# Patient Record
Sex: MALE | Race: WHITE
[De-identification: names, ages, dates, MRNs, and addresses within clinical notes are randomized per-mention and may not be internally consistent; named-entity substitution may affect disease eponyms.]

---

## 2017-08-09 ENCOUNTER — HOSPITAL ENCOUNTER (OUTPATIENT)
Dept: HOSPITAL 47 - RADECHMAIN | Age: 77
Discharge: HOME | End: 2017-08-09
Payer: MEDICARE

## 2017-08-09 DIAGNOSIS — I08.3: ICD-10-CM

## 2017-08-09 DIAGNOSIS — Z01.810: Primary | ICD-10-CM

## 2017-08-09 DIAGNOSIS — I27.2: ICD-10-CM

## 2017-08-09 DIAGNOSIS — I31.3: ICD-10-CM

## 2017-08-09 PROCEDURE — 93306 TTE W/DOPPLER COMPLETE: CPT

## 2017-08-10 NOTE — ECHOF
Referral Reason:[id number] Pre Chemo



MEASUREMENTS

--------

HEIGHT: 172.7 cm

WEIGHT: 83.0 kg

BP: 130/60

RVIDd:   3.3 cm     (< 3.3)

IVSd:   1.0 cm     (0.6 - 1.1)

LVIDd:   4.9 cm     (3.9 - 5.3)

LVPWd:   1.0 cm     (0.6 - 1.1)

IVSs:   1.5 cm

LVIDs:   4.2 cm

LVPWs:   1.5 cm

LAESV Index (A-L):   28.74 ml/m

Ao Diam:   3.8 cm     (2.0 - 3.7)

AV Cusp:   0.9 cm     (1.5 - 2.6)

LA Diam:   4.1 cm     (2.7 - 3.8)

MV E Oliver:   0.72 m/s

MV DecT:   344 ms

MV A Oliver:   0.97 m/s

MV E/A Ratio:   0.74 

AV maxP.30 mmHg

AV meanP.43 mmHg

RAP:   5.00 mmHg

RVSP:   37.37 mmHg







FINDINGS

--------

Sinus rhythm with extra systolic beats.

This was a technically adequate study.

Overall left ventricular systolic function is normal 

with, an EF between 55 - 60 %.

The right ventricle is normal in size and function.

LA is midly dilated 29-33ml/m2.

The right atrium is normal in size.

Aortic valve is trileaflet and is severely thickened.   

Trace amount of aortic regurgitation.    Moderate to 

severe aortic stenosis with peak/mean pressure gradient 

of 61.30mmHg / 37.43mmHg,

Mild mitral annular calcification present.   There is 

trace to mild mitral regurgitation.

Mild tricuspid regurgitation present.   There is mild 

pulmonary hypertension.   The right ventricular 

systolic pressure, as measured by Doppler, is 37.37mmHg.

The pulmonic valve was not well visualized.

The aortic root size is normal.

Normal inferior vena cava with normal inspiratory 

collapse consistent with estimated right atrial 

pressure of  5 mmHg.

There is a small pericardial effusion is located near 

the right ventricle.



CONCLUSIONS

--------

1. Sinus rhythm with extra systolic beats.

2. There is mild pulmonary hypertension.

3. The right ventricular systolic pressure, as measured by 

Doppler, is 37.37mmHg.

4. The pulmonic valve was not well visualized.

5. The aortic root size is normal.

6. There is a small pericardial effusion is located near 

the right ventricle.

7. This was a technically adequate study.

8. LA is midly dilated 29-33ml/m2.

9. Aortic valve is trileaflet and is severely thickened.

10. Trace amount of aortic regurgitation.

11. Moderate to severe aortic stenosis with peak/mean 

pressure gradient of 61.30mmHg / 37.43mmHg,

12. Mild mitral annular calcification present.

13. There is trace to mild mitral regurgitation.

14. Mild tricuspid regurgitation present.





SONOGRAPHER: Art Bishop RDCS

## 2017-08-11 ENCOUNTER — HOSPITAL ENCOUNTER (OUTPATIENT)
Dept: HOSPITAL 47 - OR | Age: 77
Discharge: HOME | End: 2017-08-11
Attending: SURGERY
Payer: MEDICARE

## 2017-08-11 VITALS — TEMPERATURE: 98.1 F | RESPIRATION RATE: 16 BRPM

## 2017-08-11 VITALS — DIASTOLIC BLOOD PRESSURE: 63 MMHG | SYSTOLIC BLOOD PRESSURE: 113 MMHG

## 2017-08-11 VITALS — BODY MASS INDEX: 31 KG/M2

## 2017-08-11 VITALS — HEART RATE: 72 BPM

## 2017-08-11 DIAGNOSIS — K21.9: ICD-10-CM

## 2017-08-11 DIAGNOSIS — Z91.09: ICD-10-CM

## 2017-08-11 DIAGNOSIS — I10: ICD-10-CM

## 2017-08-11 DIAGNOSIS — Z91.018: ICD-10-CM

## 2017-08-11 DIAGNOSIS — Z88.5: ICD-10-CM

## 2017-08-11 DIAGNOSIS — I48.91: ICD-10-CM

## 2017-08-11 DIAGNOSIS — C61: Primary | ICD-10-CM

## 2017-08-11 LAB
CH: 32
CHCM: 31.8
ERYTHROCYTE [DISTWIDTH] IN BLOOD BY AUTOMATED COUNT: 2.72 M/UL (ref 4.3–5.9)
ERYTHROCYTE [DISTWIDTH] IN BLOOD: 16 % (ref 11.5–15.5)
GLUCOSE BLD-MCNC: 102 MG/DL (ref 75–99)
HCT VFR BLD AUTO: 27.5 % (ref 39–53)
HDW: 3.24
HGB BLD-MCNC: 8.7 GM/DL (ref 13–17.5)
MCH RBC QN AUTO: 31.9 PG (ref 25–35)
MCHC RBC AUTO-ENTMCNC: 31.5 G/DL (ref 31–37)
MCV RBC AUTO: 101.2 FL (ref 80–100)
WBC # BLD AUTO: 19.9 K/UL (ref 3.8–10.6)

## 2017-08-11 PROCEDURE — 85027 COMPLETE CBC AUTOMATED: CPT

## 2017-08-11 PROCEDURE — 36561 INSERT TUNNELED CV CATH: CPT

## 2017-08-11 PROCEDURE — 76937 US GUIDE VASCULAR ACCESS: CPT

## 2017-08-11 PROCEDURE — 77001 FLUOROGUIDE FOR VEIN DEVICE: CPT

## 2017-08-11 NOTE — P.OP
Date of Procedure: 08/11/17


Preoperative Diagnosis: 


Metastatic prostate cancer


Difficult IV access


Postoperative Diagnosis: 


Same


Procedure(s) Performed: 


Right internal jugular 8-[place] Mediport placement under fluoroscopic and 

SonoSite guidance


Implants: 


8-[place] MediPort


Anesthesia: MAC, local


Surgeon: Stefani Swift


Pathology: none sent


Condition: stable


Disposition: PACU


Indications for Procedure: 


76 years old male presents with metastatic prostatic cancer is undergoing 

chemotherapy.  He presents today for Mediport placement


Operative Findings: 





Description of Procedure: 


The patient was brought to the operating room and placed in supine position 

with both arms tucked.  A footboard was placed.  Chlorhexidine was used to prep 

the neck followed by application of sterile drapes and an Ioban dressing .  A 

timeout was performed to verify correct patient and correct procedure.  Patient 

was confirmed to receive perioperative IV antibiotics and VTE prophylaxis.


An ultrasound was performed of the right neck to identify the carotid artery 

and internal jugular vein.  The internal jugular vein was compressible and 

patent .  Photodocumentation  was made.


Local anesthetic was infiltrated to create a field block.  Seldinger technique 

was used and the internal jugular vein was accessed under direct ultrasound 

guidance.  There was good backflow of dark venous blood.  The guidewire was 

inserted and fluoroscopic images obtained to confirm the tip in SVC.


The needle was removed followed by insertion of a dilator peel-away sheath.  

Local anesthetic was infiltrated along the inferior aspect of the right 

clavicle.  A 2.5 cm skin incision was made and dissection was carried up to the 

pectoralis major muscle.  A pocket was created for the port.  The catheter 

tubing was connected to the port using the conector after flushing both the 

port and the catheter with normal saline.  The tunneling device was connected 

to the end of the catheter and after placement of the port in the subcutaneous 

pocket the tunneling device was passed from the lower incision to  the counter 

incision in the neck.  The catheter was measured at the junction of SVC and 

right atrium.  The inner cannula of the peel-away sheath was removed and 

catheter was gradually inserted.  The peel-away sheath was gradually removed. 


Fluoroscopic image confirmed the tip of the catheter at the junction of SVC and 

right atrium.  There was no kink, fold or torsion of the catheter and the port.

  The Keith needle was used to access the port and easy backflow was obtained.  

This was flushed with 10 mL of normal saline and 10 mL of Hep-Lock was inserted.


The skin incision was closed in 3 layers using 3-0 Vicryl interrupted stitches 

and a running suture of 4-0 Monocryl.  Counter incision in the neck was also 

closed using 3-0 Vicryl followed by 4-0 Monocryl.  Dermabond skin glue was 

applied followed by Telfa and Tegaderm dressing.


The sponge, instrument and needle count were correct x2


Patient tolerated the procedure well and was taken to post anesthesia care unit 

in stable condition





Final chest x-ray showed the tip of the catheter in SVC and no pneumothorax.  

Total fluoroscopic time was 4 seconds

## 2017-08-11 NOTE — XR
EXAMINATION TYPE: XR chest 1V confirm line plcmt

 

DATE OF EXAM: 8/11/2017

 

COMPARISON: 9/21/2016

 

HISTORY: 76-year-old male Mediport placement, rule out pneumothorax

 

TECHNIQUE: Single frontal view of the chest is obtained.

 

FINDINGS:  

Right anterior chest wall injection port with catheter tip seen to the level of the lower SVC. Multip
le old healed right-sided rib fracture deformities are present. These fracture deformities cause abno
rmal linear densities and lucencies in the apex making assessment for a trace pneumothorax difficult.
 No sizable pneumothorax is seen. Old healed left clavicular shaft fracture deformity and suspected o
ld healed fracture distal right clavicle. There is some blunting of the left costophrenic angle noted
. The heart is normal size. Pulmonary vasculature within normal limits.

 

 

 

IMPRESSION:  

1. Right-sided injection port with tip at the lower SVC.

2. Extensive healed fracture deformities of multiple right-sided ribs. This distorts the normal appea
glenn at the right apex limiting assessment for small pneumothorax. No sizable pneumothorax is seen. 
Consider short interval follow-up.

3. Trace blunting of the left costophrenic angle could represent trace effusion or small amount of pa
tchy atelectasis/infiltrate.

## 2017-08-11 NOTE — FL
Fluoroscopy

 

HISTORY: Port-A-Cath insertion

 

4 seconds fluoroscopy time supplied to the referring clinician.  1 intraoperative C-arm images docume
nt the procedure. See dictated report from general surgery.

## 2018-05-01 ENCOUNTER — HOSPITAL ENCOUNTER (OUTPATIENT)
Dept: HOSPITAL 47 - RADNMMAIN | Age: 78
End: 2018-05-01
Payer: OTHER GOVERNMENT

## 2018-05-01 DIAGNOSIS — C61: Primary | ICD-10-CM

## 2018-05-01 LAB — BUN SERPL-SCNC: 18 MG/DL (ref 9–20)

## 2018-05-01 PROCEDURE — 82565 ASSAY OF CREATININE: CPT

## 2018-05-01 PROCEDURE — 84520 ASSAY OF UREA NITROGEN: CPT

## 2018-05-01 PROCEDURE — 78306 BONE IMAGING WHOLE BODY: CPT

## 2018-05-01 NOTE — NM
EXAMINATION TYPE: NM bone scan whole body

 

DATE OF EXAM: 5/1/2018

 

COMPARISON: Prior bone scan and CT 10/26/2017

 

HISTORY: Prostate cancer

 

Delayed whole-body scanning was performed following the injection of 25.1 mCi Tc 99m MDP.  Images acq
uired 3 hours post injection.

 

FINDINGS: 

 

Uptake at the level of the proximal femur is again noted. Suspect there is an ostomy bag in the right
 lower quadrant. Uptake present at the level of the pubic symphysis on the right greater than left, i
schium on the left and ilium on the right and posterior right eighth rib appear less intense. Questio
n a small focus of uptake involving the sternum on the right. Sacral activity corresponds to abnormal
ity seen on CT.

 

There is a mild spinal curvature. Old right-sided rib fractures again noted posteriorly, old left-bina
ed clavicular fracture noted and appears healed.

 

IMPRESSION: 

 

Some improvement in the intensity of uptake is noted.

## 2018-05-04 ENCOUNTER — HOSPITAL ENCOUNTER (OUTPATIENT)
Dept: HOSPITAL 47 - RADPROMAIN | Age: 78
Discharge: HOME | End: 2018-05-04
Payer: OTHER GOVERNMENT

## 2018-05-04 DIAGNOSIS — C61: Primary | ICD-10-CM

## 2018-05-04 DIAGNOSIS — C78.7: ICD-10-CM

## 2018-05-04 LAB — BUN SERPL-SCNC: 9 MG/DL (ref 9–20)

## 2018-05-04 PROCEDURE — 71260 CT THORAX DX C+: CPT

## 2018-05-04 PROCEDURE — 82565 ASSAY OF CREATININE: CPT

## 2018-05-04 PROCEDURE — 74177 CT ABD & PELVIS W/CONTRAST: CPT

## 2018-05-04 PROCEDURE — 84520 ASSAY OF UREA NITROGEN: CPT

## 2018-05-04 NOTE — CT
EXAMINATION TYPE: CT ChestAbdPelvis w con

 

DATE OF EXAM: 5/4/2018

 

COMPARISON: Prior CT chest abdomen and pelvis October 26, 2017 and older CT studies.

 

HISTORY: Prostate CA, observed for metastases.

 

CT DLP: 762.8 mGycm. Automated Exposure Control for Dose Reduction was Utilized.

 

 

CONTRAST: 

CT scan of the thorax, abdomen and pelvis is performed with oral and with IV Contrast, patient inject
ed with 100 mL of Isovue 300.

 

FINDINGS:

 

LUNGS: There is bibasilar scarring and/or atelectasis. No suspicious greater than 5 mm new parenchyma
l nodules or masses are present. There is no pleural effusion or pneumothorax seen.  The tracheobronc
hial tree is patent.

 

MEDIASTINUM: There are no greater than 1 cm hilar or mediastinal lymph nodes.   No cardiomegaly or pe
ricardial effusion is seen.  Coronary artery calcification is present which is noted marker for coron
israel artery disease. There is stable right internal jugular Mediport catheter.

 

OTHER:  No obvious supraclavicular adenopathy on current study

 

LIVER/GB: Multiple hypodense lesions consistent with hepatic metastatic disease are redemonstrated. T
arget lesion anterior segment right hepatic lobe measures 4.7 cm long axis adjacent to is slightly hy
podense oval lesion posteriorly axial image 53 measured 5.5 cm long axis prior study image 52. Lesion
 anterior aspect lateral segment left hepatic lobe measures 4.3 cm long axis axial image 54 versus 5.
2 cm long axis prior study image 52. Overall all suspicious lesions appear slightly smaller in size f
rom prior study. There are few denser lesions felt to reflect simple cysts are felt stable. No defini
tive new lesions are seen.

 

PANCREAS: No significant abnormality is seen.

 

SPLEEN: No significant abnormality is seen.

 

ADRENALS: Stable slight nodularity posterior limb left adrenal gland axial image 58 favored benign.

 

KIDNEYS: Along lateral aspect of right kidney there is new focal somewhat ill-defined fluid mid pole 
level axial image 66 of uncertain etiology. There is partially duplicated right-sided collecting syst
em with central calcifications likely vascular in etiology. There is symmetric cortical medullary upt
marcelo and excretion from both kidneys without hydronephrosis identified bilaterally. Bladder is surgica
lly absent. There is right-sided ileal conduit redemonstrated.

 

BOWEL: There is persistent distal colectomy with left mid sided colostomy. A few diverticula in the d
istal colon are present. There is no suspicious small or large bowel dilatation. There is persistent 
abnormal soft tissue in the presacral space near axial image 109 felt fairly stable favoring scar tis
jesse from prior surgery. There is stable small to moderate-sized hiatal hernia. No adjacent adenopathy
 is seen.

 

GENITAL ORGANS: Prostate is surgically absent.

 

LYMPH NODES: No new greater than 1cm abdominal or pelvic lymph nodes are appreciated. Stable subcenti
meter left periaortic lymph node measures 11 x 5 mm axial image 60.

 

OSSEOUS STRUCTURES: Metallic hardware in the right proximal femur is redemonstrated causing streak ar
tifact. Moderate to severe joint space loss and spurring and left hip is present.

 

Sclerotic metastatic disease involving left posterior acetabulum, right iliac crest coronal image 55,
 and lower sacrum seen best coronal image 79 is redemonstrated felt stable. There is demineralization
 with slight underlying scoliotic curvature moderate to severe multilevel spurring. There are numerou
s right-sided posterior and lateral healed rib fractures.

 

OTHER: There is moderate to severe calcified plaque of aorta extending into branch vessels

 

IMPRESSION: Overall positive treatment response (suspected patient is currently on chemotherapy) with
 diminished size of hepatic metastatic lesions. No new metastatic lesions are seen.  By RECIST criter
ia there is however less than 30% response and is thus technically stable disease by RECIST.

## 2018-07-31 ENCOUNTER — HOSPITAL ENCOUNTER (OUTPATIENT)
Dept: HOSPITAL 47 - RADNMMAIN | Age: 78
Discharge: HOME | End: 2018-07-31
Attending: INTERNAL MEDICINE
Payer: OTHER GOVERNMENT

## 2018-07-31 DIAGNOSIS — K76.9: ICD-10-CM

## 2018-07-31 DIAGNOSIS — R91.8: ICD-10-CM

## 2018-07-31 DIAGNOSIS — C61: ICD-10-CM

## 2018-07-31 DIAGNOSIS — R93.7: Primary | ICD-10-CM

## 2018-07-31 LAB — BUN SERPL-SCNC: 13 MG/DL (ref 9–20)

## 2018-07-31 PROCEDURE — 71260 CT THORAX DX C+: CPT

## 2018-07-31 PROCEDURE — 74177 CT ABD & PELVIS W/CONTRAST: CPT

## 2018-07-31 PROCEDURE — 82565 ASSAY OF CREATININE: CPT

## 2018-07-31 PROCEDURE — 78306 BONE IMAGING WHOLE BODY: CPT

## 2018-07-31 PROCEDURE — 84520 ASSAY OF UREA NITROGEN: CPT

## 2018-07-31 RX ADMIN — SODIUM CHLORIDE, PRESERVATIVE FREE PRN ML: 5 INJECTION INTRAVENOUS at 12:15

## 2018-07-31 RX ADMIN — SODIUM CHLORIDE, PRESERVATIVE FREE PRN ML: 5 INJECTION INTRAVENOUS at 10:25

## 2018-07-31 NOTE — NM
EXAMINATION TYPE: NM bone scan whole body

 

DATE OF EXAM: 7/31/2018

 

COMPARISON: 5/1/2018

 

HISTORY: Prostate cancer

 

Delayed whole-body scanning was performed following the injection of 24.5 mCi Tc 99m MDP.  Images acq
uired 3 hours post injection.

 

FINDINGS: 

Uptake at the level of the proximal femur is again noted. Suspect there is an ostomy bag in the right
 lower quadrant. Uptake present at the level of the pubic symphysis on the right greater than left, i
schium on the left and ilium on the right and posterior right eighth rib appear less intense. Questio
n a small focus of uptake involving the sternum on the right. Sacral activity corresponds to abnormal
ity seen on CT. 

 

There is a mild spinal curvature. Old right-sided rib fractures again noted posteriorly, old left-bina
ed clavicular fracture noted and appears healed. 

 

 

IMPRESSION: 

 

Stable abnormal uptake unchanged from the prior exam. Findings compatible with metastases.

## 2018-07-31 NOTE — CT
EXAMINATION TYPE: CT ChestAbdPelvis w con

 

DATE OF EXAM: 7/31/2018

 

INDICATION: Prostate CA, suspect mets

 

COMPARISON: 5/4/2018

 

CT DLP: 814.2 mGycm

 

CONTRAST: 

Performed with Oral Contrast and with IV Contrast, patient injected with 100 mL of Isovue 300.

 

TECHNIQUE: Axial images at 5 mm thick sections.  Reconstructed images in the coronal plane.  Delayed 
images through the kidneys.  

 

FINDINGS:

 

CT CHEST:

 

Portion of the thyroid visualized is normal.

 

No suspicious lung nodules or focal infiltrates are present.

 

No enlarged mediastinal or hilar adenopathy is evident. 

 

The ascending aorta diameter at the level of the main pulmonary artery is 3.4 cm.  The main pulmonary
 artery diameter at the bifurcation is 2.7 cm. Coronary artery calcifications present. Small right pl
eural effusion may be present. There is a 0.5 cm nodule at the posterior lateral left midlung. This w
as faintly visualized previously. Interval growth is not evident. Series 4 image 33 additional puncta
te densities in the right lower lobe superior segment measuring 0.3 cm. Series 4 image 33.

 

CT ABDOMEN:

 

Liver: There are multiple enlarged hypodensities with ill-defined margins within the liver compatible
 with metastatic disease. These are less defined than the comparison. Where comparable these appear l
arger. There is a more hypodense area within the mid medial right lobe liver measuring 5.0 cm which i
s enlarged from comparison. This has Hounsfield unit measurements of 23. Lesions are within both left
 and right lobes of liver. A more infiltrative process is not excluded in the left lobe liver the cur
rent examination. 

 

Spleen: Normal

 

Pancreas: Normal

 

Adrenal glands: The adrenal glands are normal.

 

Gallbladder: Normal  

 

Kidneys: No masses are evident. No hydronephrosis is present.   There is an ill-defined 3.2 cm hypode
nsity measuring 4 Hounsfield units on the posterior lateral right mid kidney present previously.  Del
ayed images were obtained through the kidneys, which remain unremarkable.

 

Aorta: Vascular calcification is within the aorta. 

 

Inferior vena cava: Normal.

 

CT PELVIS: 

Scattered diverticuli are within the colon. Ostomy site is in the right lower quadrant. There is a di
lated right ureter urostomy bag is on the right. Left ureter is less prominent than on the right.

 

 There are loops of bowel which are incompletely distended or lack oral contrast limiting their evalu
ation.

 

Appendix: Normal as visualized.

 

Urinary bladder: Surgically absent 

 

Genitourinary structures: Prostate is absent. Loops of bowel falling into the lower portion of the pe
lvis.

 

Osseous structures: There is a large sclerotic metastasis along the posterior left ilium. Prior repai
r of fractures in the right femur. Scoliosis and degenerative changes are through the spine.

 

IMPRESSIONS:

1. Hepatic lesions are less distinct than on the prior study and a more infiltrative process may be p
resent. Lesions appear larger than the comparison study.

2. Sclerotic metastasis likely along the posterior left ilium.

3. Postsurgical changes. There is some prominence of the right ureter.

4. Couple of small nodules within the bilateral lung fields, present previously. On the right this ha
s enlarged slightly from comparison

## 2018-09-30 ENCOUNTER — HOSPITAL ENCOUNTER (EMERGENCY)
Dept: HOSPITAL 47 - EC | Age: 78
Discharge: HOME | End: 2018-09-30
Payer: OTHER GOVERNMENT

## 2018-09-30 VITALS — RESPIRATION RATE: 16 BRPM

## 2018-09-30 VITALS — DIASTOLIC BLOOD PRESSURE: 80 MMHG | SYSTOLIC BLOOD PRESSURE: 144 MMHG | TEMPERATURE: 98 F | HEART RATE: 74 BPM

## 2018-09-30 DIAGNOSIS — Z85.46: ICD-10-CM

## 2018-09-30 DIAGNOSIS — Z91.018: ICD-10-CM

## 2018-09-30 DIAGNOSIS — Z87.891: ICD-10-CM

## 2018-09-30 DIAGNOSIS — Z79.899: ICD-10-CM

## 2018-09-30 DIAGNOSIS — Z88.6: ICD-10-CM

## 2018-09-30 DIAGNOSIS — I48.91: ICD-10-CM

## 2018-09-30 DIAGNOSIS — Z85.51: ICD-10-CM

## 2018-09-30 DIAGNOSIS — Z79.52: ICD-10-CM

## 2018-09-30 DIAGNOSIS — M79.89: Primary | ICD-10-CM

## 2018-09-30 DIAGNOSIS — I10: ICD-10-CM

## 2018-09-30 DIAGNOSIS — Z85.048: ICD-10-CM

## 2018-09-30 PROCEDURE — 99284 EMERGENCY DEPT VISIT MOD MDM: CPT

## 2018-09-30 NOTE — US
EXAMINATION TYPE: US venous Doppler duplex UE RT

 

DATE OF EXAM: 9/30/2018

 

COMPARISON: NONE

 

CLINICAL HISTORY: Pain. Right hand swelling x 3 days. Patient stated was in his home's crawl space ch
Hammerless furnace filter 1 week ago; also stated suffered laceration to right  lateral posterior wrist 3
 weeks ago.

 

SIDE PERFORMED: Right 

 

Right Arm: Negative for DVT. Multiple anterior fluid channels noted at posterior right hand swelling

 

IMPRESSION:

1. THIS EXAMINATION IS NEGATIVE FOR DVT RIGHT ARM.

2. CONSIDERABLE EDEMA IN THE DORSUM OF THE HAND.

## 2018-09-30 NOTE — XR
EXAMINATION TYPE: XR hand complete RT , 5 YEARS

 

DATE OF EXAM ORDERED: 9/30/2018

 

HISTORY: Pain.

 

COMPARISON: None.

 

FINDINGS:  The study is poorly exposed in spite of repeated attempts to improve the exposure. No frac
ture, dislocation or other acute osseous lesion is seen. There is considerable soft tissue swelling. 
There is degenerative change at the first carpal metacarpal joint.

 

IMPRESSION: 

1. LARGE AMOUNT OF SOFT TISSUE SWELLING.

2. POOR EXPOSURE.

3. NO ACUTE OSSEOUS LESION.

4. DEGENERATIVE CHANGE.

## 2018-09-30 NOTE — ED
Extremity Problem HPI





- General


Source: patient, RN notes reviewed, old records reviewed


Mode of arrival: ambulatory


Limitations: no limitations





<Amparo Lyle - Last Filed: 09/30/18 09:43>





<Ibrahima Martins - Last Filed: 09/30/18 09:48>





- General


Chief complaint: Extremity Problem,Nontraumatic


Stated complaint: RT HAND SWELLING


Time Seen by Provider: 09/30/18 07:47





- History of Present Illness


Initial comments: 





Patient is a 78-year-old male with history of stage IV prostate cancer presents 

emergency Department chief complaint of painless right hand swelling.  Patient 

reports that he was crawling under his DAC 3 days ago and was changing his 

furnace filter.  Patient reports that he has a small abrasion over the anterior 

aspect of the wrist.  Denies any pain or erythematous changes to the skin of 

the wrist and hand.  He reports he has full range of motion.  He states he has 

no elbow or shoulder pain.  He denies any trauma to the hand or wrist. (

Amparo Lyle)





- Related Data


 Home Medications











 Medication  Instructions  Recorded  Confirmed


 


amLODIPine BES/OLMESARTAN MED 1 tab PO DAILY 07/23/15 08/11/17





[Fabricio 5-40 mg Tablet]   


 


Calcium Carbonate [Calcium] 600 mg PO DAILY 08/08/17 08/11/17


 


Ferrous Sulfate [Iron] 325 mg PO DAILY 08/08/17 08/11/17


 


aMILoride HCL 5 mg PO DAILY 08/08/17 08/11/17


 


predniSONE 10 mg PO DAILY 08/08/17 08/11/17








 Previous Rx's











 Medication  Instructions  Recorded


 


HYDROcodone/APAP 5-325MG [Norco 1 - 2 each PO Q4-6H PRN #60 tab 09/26/16





5-325]  


 


Sennosides-Docusate Sodium 2 tab PO DAILY #30 tablet 09/26/16





[Senokot-S]  











 Allergies











Allergy/AdvReac Type Severity Reaction Status Date / Time


 


hydromorphone HCl Allergy  Confusion Verified 07/31/18 11:25





[From Dilaudid]     


 


Mushroom Allergy  Swelling Verified 07/31/18 11:25














Review of Systems


ROS Other: All systems not noted in ROS Statement are negative.





<Amparo Lyle - Last Filed: 09/30/18 09:43>


ROS Other: All systems not noted in ROS Statement are negative.





<Ibrahima Martins - Last Filed: 09/30/18 09:48>


ROS Statement: 


Those systems with pertinent positive or pertinent negative responses have been 

documented in the HPI.








Past Medical History


Past Medical History: Atrial Fibrillation, Cancer, Hypertension, Prostate 

Disorder, Skin Disorder


Additional Past Medical History / Comment(s): prostate  bladder and rectal CA, 

radiation 8-9 months ago; chemo finished 1 week ago; occ rash on nose; arthritis


History of Any Multi-Drug Resistant Organisms: None Reported


Past Surgical History: Bladder Surgery, Prostate Surgery


Additional Past Surgical History / Comment(s): prostate, bladder and rectum 

removal; ileostomy & urostomy; hernia repair; rotator cuff;.  FX Leg 35 yrs ago.


Past Anesthesia/Blood Transfusion Reactions: No Reported Reaction


Additional Past Anesthesia/Blood Transfusion Reaction / Comment(s): blood 

transfusion last month


Past Psychological History: No Psychological Hx Reported


Smoking Status: Former smoker


Past Alcohol Use History: None Reported


Past Drug Use History: None Reported





- Past Family History


  ** Brother(s)


Family Medical History: Cancer





<Amparo Lyle - Last Filed: 09/30/18 09:43>





General Exam


Limitations: no limitations


General appearance: alert, in no apparent distress


Head exam: Present: atraumatic, normocephalic, normal inspection


Eye exam: Present: normal appearance, PERRL, EOMI.  Absent: scleral icterus, 

conjunctival injection, periorbital swelling


ENT exam: Present: normal exam, mucous membranes moist


Neck exam: Present: normal inspection.  Absent: tenderness, meningismus, 

lymphadenopathy


Respiratory exam: Present: normal lung sounds bilaterally


Cardiovascular Exam: Present: regular rate, normal rhythm, normal heart sounds.

  Absent: systolic murmur, diastolic murmur, rubs, gallop, clicks


  ** Right


Upper Arm exam: Present: normal inspection, full ROM


Elbow exam: Present: normal inspection, full ROM


Forearm Wrist exam: Present: normal inspection, full ROM, swelling (Minimal 

swelling onto the dorsum of the right hand. )


Hand Wrist exam: Present: full ROM, swelling.  Absent: normal inspection, 

tenderness, abrasion, ecchymosis, deformity, crepitus, erythema


Neuro motor exam: Present: wrist extension intact, thumb opposition intact, 

thumb IP flexion intact, thumb adduction intact, fingers 2-5 abduction intact


Vascular: Present: normal capillary refill


Back exam: Present: normal inspection


Neurological exam: Present: alert, oriented X3, CN II-XII intact


Psychiatric exam: Present: normal affect, normal mood





<Amparo Lyle - Last Filed: 09/30/18 09:43>





<Ibrahima Martins - Last Filed: 09/30/18 09:48>





- General Exam Comments


Initial Comments: 





Pleasant 78-year-old male.  No significant distress. (ValdoAmparo)





Course





<Amparo Lyle - Last Filed: 09/30/18 09:43>





<Ibrahima Martins - Last Filed: 09/30/18 09:48>





 Vital Signs











  09/30/18





  07:41


 


Temperature 97.4 F L


 


Pulse Rate 78


 


Respiratory 16





Rate 


 


Blood Pressure 142/62


 


O2 Sat by Pulse 99





Oximetry 














- Reevaluation(s)


Reevaluation #1: 





09/30/18 09:47


PA supervision: I personally saw the patient and examined him.  I reviewed and 

agree with the PA findings including all diagnostic interpretations and 

treatment plans is written unless otherwise stated.  Patient does relate to me 

that he's had 2 episodes once were he tripped and fell against a door jamb 

striking his right hand last couple weeks addition to this he was crawling 

underneath his house to change a furnace filter.  No evidence of fractures on x-

rays no evidence of DVT. (Ibrahima Martins)





Medical Decision Making





- Radiology Data


Radiology results: report reviewed





<Amparo Lyle - Last Filed: 09/30/18 09:43>





<EliezerIbrahima - Last Filed: 09/30/18 09:48>





- Medical Decision Making





Patient is a 78-year-old male presents emergency room today with chief 

complaint of swelling to the right hand.  Patient has full range of motion of 

the hand, normal capillary refill and radial pulse.  Patient has no overlying 

skin changes.  He has no significant pain.  Patient at this time had an 

ultrasound which was negative for DVT.  X-ray of the hand shows soft tissue 

swelling but no bony or maladies.  Patient later relates that he did fall and 

catch himself on the hand.  He has no tenderness over the snuffbox.  Discussed 

close likely just contusion soft tissue swelling related to the trauma that 

time.  I also had Dr. Martins examined the Patient.  Recommends to keep him up and 

elevated at this time.  Discussed that he has any worsening pain or symptoms 

including skin changes he should return for reevaluation.  Patient agrees to 

treatment plan will comply.  Return parameters were discussed. (Amparo Lyle)





- Radiology Data


Occur shows large amount of soft tissue swelling.  Poor exposure.  No acute 

osseous lesions noted. (Amparo Lyle)





Disposition


Is patient prescribed a controlled substance at d/c from ED?: No


Time of Disposition: 09:45





<Amparo Lyle - Last Filed: 09/30/18 09:43>





<Ibrahima Martins - Last Filed: 09/30/18 09:48>


Clinical Impression: 


 Swelling of right hand





Disposition: HOME SELF-CARE


Condition: Good


Instructions:  Swollen Joint (ED)


Additional Instructions: 


He  has a follow-up with your primary care physician within the next 1-2 days.  

If there is any overlying skin changes including redness, or significant 

bruising present return to the emergency department for reevaluation.  Patient 

advised  to keep hand up and elevated as much as possible for the next 24-48 

hours.


Referrals: 


Eliezer Rick DO [STAFF PHYSICIAN] - 1-2 days

## 2018-10-12 ENCOUNTER — HOSPITAL ENCOUNTER (OUTPATIENT)
Dept: HOSPITAL 47 - RADNMMAIN | Age: 78
Discharge: HOME | End: 2018-10-12
Attending: INTERNAL MEDICINE
Payer: OTHER GOVERNMENT

## 2018-10-12 DIAGNOSIS — R94.8: ICD-10-CM

## 2018-10-12 DIAGNOSIS — J90: Primary | ICD-10-CM

## 2018-10-12 DIAGNOSIS — C61: ICD-10-CM

## 2018-10-12 LAB — BUN SERPL-SCNC: 17 MG/DL (ref 9–20)

## 2018-10-12 PROCEDURE — 74177 CT ABD & PELVIS W/CONTRAST: CPT

## 2018-10-12 PROCEDURE — 36415 COLL VENOUS BLD VENIPUNCTURE: CPT

## 2018-10-12 PROCEDURE — 71260 CT THORAX DX C+: CPT

## 2018-10-12 PROCEDURE — 82565 ASSAY OF CREATININE: CPT

## 2018-10-12 PROCEDURE — 78306 BONE IMAGING WHOLE BODY: CPT

## 2018-10-12 PROCEDURE — 84520 ASSAY OF UREA NITROGEN: CPT

## 2018-10-12 NOTE — CT
EXAMINATION TYPE: CT ChestAbdPelvis w con

 

DATE OF EXAM: 10/12/2018

 

COMPARISON: CT chest abdomen pelvis July 31, 2018 and older studies

 

HISTORY: observe for mets, prostate ca. possible fx RT lower rib. Concerned with lack of tissue aroun
d rectum.

 

CT DLP: 685.10 mGycm. Automated Exposure Control for Dose Reduction was Utilized.

 

CONTRAST: 

CT scan of the thorax, abdomen and pelvis is performed with oral and with IV Contrast, patient inject
ed with 100 mL of Isovue 300.

 

FINDINGS:

 

LUNGS: There is new small left pleural effusion. There is associated compressive atelectasis in the l
eft lung base. No new concerning nodule or mass is present. There is stable 3 mm left mid lung nodule
 laterally axial image 33.

 

MEDIASTINUM: There are no greater than 1 cm hilar or mediastinal lymph nodes. There is stable tiny pe
ricardial effusion.  Coronary artery calcification and/or stents are redemonstrated. No new cardiomeg
shakeel. Stable right internal jugular Mediport catheter. Subcentimeter lymph nodes right pericardial reg
ion are stable axial image 48.

 

OTHER:  No additional significant abnormality is seen.

 

LIVER/GB: Diffuse hepatic metastatic disease continues to progress with new and larger hypodense lesi
ons present. Due to confluent appearance accurate measurements is difficult. Worsening involvement he
patic dome there are axial image 49 is noted. Central hypodense lesion now has surrounding hypodensit
y that is slightly less dense measuring 7.8 cm long axis axial image 56 where it measured 5.4 cm on l
marcin axis prior study.

 

PANCREAS: No suspicious mass or ductal dilatation.

 

SPLEEN: No significant abnormality is seen.

 

ADRENALS: No significant abnormality is seen.

 

KIDNEYS: Central calcifications both kidneys favor vascular. There is symmetric cortical medullary up
take and excretion from both kidneys without hydronephrosis bilaterally. Bladder is surgically absent
. Right pelvic ileal conduit is redemonstrated.

 

BOWEL: There is distal colectomy with left lower quadrant colostomy. Oral contrast reaches stoma. The
re is no suspicious small or large bowel dilatation. Diverticula at stoma in distal colon are redemon
strated.

 

GENITAL ORGANS: Prostate is not well seen and presumed surgically absent.

 

LYMPH NODES: No greater than 1cm abdominal or pelvic lymph nodes are appreciated.

 

OSSEOUS STRUCTURES: Surgical change to right proximal femur is partially imaged. Osseous structures a
re demineralized. There is multilevel moderate to severe spurring throughout the thoracolumbar spine.
 There is moderate disc space narrowing with vacuum disc phenomenon L4-L5 level redemonstrated. Under
lying S-shaped scoliosis is again seen. Irregular sclerotic lesion posterior left iliac bone is felt 
stable near axial image 110. Sclerosis in the sacrum for reference coronal image 76 inferiorly is red
emonstrated without significant interval change. Sclerosis also involves right pubic symphysis not si
gnificantly changed from prior. Suspect old fracture distal right clavicle. Old trauma to right ribs 
is seen with fracture deformity and bridging. Sclerosis of old fractured right posterior lateral eigh
th rib is redemonstrated.

 

OTHER: Prominent right-sided soft tissue presacral space is nonspecific narrowing axial image 110 but
 not significant change from prior studies favoring scarring.

 

IMPRESSION: Suspected continued interval progression of hepatic metastatic disease. New small left pl
eural effusion noted. Suspected stable osseous metastatic disease.

## 2018-10-12 NOTE — NM
EXAMINATION TYPE: NM bone scan whole body

 

DATE OF EXAM: 10/12/2018

 

COMPARISON: 7/31/2018 bone scan, CT scan 10/12/2018

 

HISTORY: Prostate cancer

 

Delayed whole-body scanning was performed following the injection of 23.9 mCi Tc 99m MDP.  Images acq
uired 3 hours post injection.

 

FINDINGS:

Abnormal uptake involving the right iliac bone, left acetabulum and bilateral pubic rami, right proxi
mal femur, right iliac bone, right rib cage are again noted appears similar relative to the prior exa
m. Faint uptake involving the lateral right rib cage likely in the basis of previous trauma. The more
 intense lesion is compatible with known metastasis and CT finding. Uptake within the right-sided ost
luis alberto suspected.

 

 IMPRESSION:

1. Skeletal uptake noted appears stable suggestive of metastasis similar appearance to the prior exam
.

## 2019-02-01 ENCOUNTER — HOSPITAL ENCOUNTER (INPATIENT)
Dept: HOSPITAL 47 - EC | Age: 79
LOS: 8 days | DRG: 435 | End: 2019-02-09
Attending: INTERNAL MEDICINE | Admitting: INTERNAL MEDICINE
Payer: OTHER GOVERNMENT

## 2019-02-01 VITALS — BODY MASS INDEX: 26.4 KG/M2

## 2019-02-01 DIAGNOSIS — Z88.5: ICD-10-CM

## 2019-02-01 DIAGNOSIS — K56.7: ICD-10-CM

## 2019-02-01 DIAGNOSIS — Z90.79: ICD-10-CM

## 2019-02-01 DIAGNOSIS — Z79.899: ICD-10-CM

## 2019-02-01 DIAGNOSIS — H93.11: ICD-10-CM

## 2019-02-01 DIAGNOSIS — Z87.311: ICD-10-CM

## 2019-02-01 DIAGNOSIS — N17.0: ICD-10-CM

## 2019-02-01 DIAGNOSIS — I50.33: ICD-10-CM

## 2019-02-01 DIAGNOSIS — L98.9: ICD-10-CM

## 2019-02-01 DIAGNOSIS — I31.3: ICD-10-CM

## 2019-02-01 DIAGNOSIS — J98.11: ICD-10-CM

## 2019-02-01 DIAGNOSIS — Z93.3: ICD-10-CM

## 2019-02-01 DIAGNOSIS — C78.7: Primary | ICD-10-CM

## 2019-02-01 DIAGNOSIS — Z66: ICD-10-CM

## 2019-02-01 DIAGNOSIS — I48.0: ICD-10-CM

## 2019-02-01 DIAGNOSIS — E86.0: ICD-10-CM

## 2019-02-01 DIAGNOSIS — J18.9: ICD-10-CM

## 2019-02-01 DIAGNOSIS — Z51.5: ICD-10-CM

## 2019-02-01 DIAGNOSIS — K72.90: ICD-10-CM

## 2019-02-01 DIAGNOSIS — E87.2: ICD-10-CM

## 2019-02-01 DIAGNOSIS — Z92.3: ICD-10-CM

## 2019-02-01 DIAGNOSIS — Z92.21: ICD-10-CM

## 2019-02-01 DIAGNOSIS — I95.9: ICD-10-CM

## 2019-02-01 DIAGNOSIS — N39.0: ICD-10-CM

## 2019-02-01 DIAGNOSIS — Z87.891: ICD-10-CM

## 2019-02-01 DIAGNOSIS — R18.8: ICD-10-CM

## 2019-02-01 DIAGNOSIS — Z93.6: ICD-10-CM

## 2019-02-01 DIAGNOSIS — Z91.018: ICD-10-CM

## 2019-02-01 DIAGNOSIS — K83.1: ICD-10-CM

## 2019-02-01 DIAGNOSIS — Z82.49: ICD-10-CM

## 2019-02-01 DIAGNOSIS — E83.51: ICD-10-CM

## 2019-02-01 DIAGNOSIS — Z85.46: ICD-10-CM

## 2019-02-01 DIAGNOSIS — B96.89: ICD-10-CM

## 2019-02-01 DIAGNOSIS — I35.2: ICD-10-CM

## 2019-02-01 DIAGNOSIS — Z90.6: ICD-10-CM

## 2019-02-01 DIAGNOSIS — Z86.2: ICD-10-CM

## 2019-02-01 DIAGNOSIS — I11.0: ICD-10-CM

## 2019-02-01 DIAGNOSIS — M19.90: ICD-10-CM

## 2019-02-01 DIAGNOSIS — Z80.9: ICD-10-CM

## 2019-02-01 LAB
ALBUMIN SERPL-MCNC: 2.7 G/DL (ref 3.5–5)
ALP SERPL-CCNC: 331 U/L (ref 38–126)
ALT SERPL-CCNC: 44 U/L (ref 21–72)
ANION GAP SERPL CALC-SCNC: 10 MMOL/L
APTT BLD: 29.2 SEC (ref 22–30)
AST SERPL-CCNC: 111 U/L (ref 17–59)
BASOPHILS # BLD AUTO: 0 K/UL (ref 0–0.2)
BASOPHILS NFR BLD AUTO: 0 %
BILIRUB UR QL STRIP.AUTO: (no result)
BUN SERPL-SCNC: 23 MG/DL (ref 9–20)
CALCIUM SPEC-MCNC: 7.2 MG/DL (ref 8.4–10.2)
CHLORIDE SERPL-SCNC: 108 MMOL/L (ref 98–107)
CO2 SERPL-SCNC: 17 MMOL/L (ref 22–30)
EOSINOPHIL # BLD AUTO: 0 K/UL (ref 0–0.7)
EOSINOPHIL NFR BLD AUTO: 0 %
ERYTHROCYTE [DISTWIDTH] IN BLOOD BY AUTOMATED COUNT: 2.98 M/UL (ref 4.3–5.9)
ERYTHROCYTE [DISTWIDTH] IN BLOOD: 18.2 % (ref 11.5–15.5)
GLUCOSE SERPL-MCNC: 98 MG/DL (ref 74–99)
HCT VFR BLD AUTO: 30.2 % (ref 39–53)
HGB BLD-MCNC: 9.2 GM/DL (ref 13–17.5)
HYALINE CASTS UR QL AUTO: 88 /LPF (ref 0–2)
INR PPP: 1.3 (ref ?–1.2)
LYMPHOCYTES # SPEC AUTO: 0.5 K/UL (ref 1–4.8)
LYMPHOCYTES NFR SPEC AUTO: 13 %
MCH RBC QN AUTO: 30.9 PG (ref 25–35)
MCHC RBC AUTO-ENTMCNC: 30.5 G/DL (ref 31–37)
MCV RBC AUTO: 101.3 FL (ref 80–100)
MONOCYTES # BLD AUTO: 0.3 K/UL (ref 0–1)
MONOCYTES NFR BLD AUTO: 8 %
NEUTROPHILS # BLD AUTO: 3 K/UL (ref 1.3–7.7)
NEUTROPHILS NFR BLD AUTO: 75 %
PH UR: 6 [PH] (ref 5–8)
PLATELET # BLD AUTO: 162 K/UL (ref 150–450)
POTASSIUM SERPL-SCNC: 4.1 MMOL/L (ref 3.5–5.1)
PROT SERPL-MCNC: 6.4 G/DL (ref 6.3–8.2)
PROT UR QL: (no result)
PT BLD: 13.2 SEC (ref 9–12)
RBC UR QL: 23 /HPF (ref 0–5)
SODIUM SERPL-SCNC: 135 MMOL/L (ref 137–145)
SP GR UR: 1.01 (ref 1–1.03)
SQUAMOUS UR QL AUTO: <1 /HPF (ref 0–4)
UROBILINOGEN UR QL STRIP: 2 MG/DL (ref ?–2)
WBC # BLD AUTO: 4 K/UL (ref 3.8–10.6)
WBC #/AREA URNS HPF: 4 /HPF (ref 0–5)

## 2019-02-01 PROCEDURE — 99285 EMERGENCY DEPT VISIT HI MDM: CPT

## 2019-02-01 PROCEDURE — 82140 ASSAY OF AMMONIA: CPT

## 2019-02-01 PROCEDURE — 87186 SC STD MICRODIL/AGAR DIL: CPT

## 2019-02-01 PROCEDURE — 74328 X-RAY BILE DUCT ENDOSCOPY: CPT

## 2019-02-01 PROCEDURE — 71046 X-RAY EXAM CHEST 2 VIEWS: CPT

## 2019-02-01 PROCEDURE — 43274 ERCP DUCT STENT PLACEMENT: CPT

## 2019-02-01 PROCEDURE — 85027 COMPLETE CBC AUTOMATED: CPT

## 2019-02-01 PROCEDURE — 93005 ELECTROCARDIOGRAM TRACING: CPT

## 2019-02-01 PROCEDURE — 76705 ECHO EXAM OF ABDOMEN: CPT

## 2019-02-01 PROCEDURE — 87086 URINE CULTURE/COLONY COUNT: CPT

## 2019-02-01 PROCEDURE — 85730 THROMBOPLASTIN TIME PARTIAL: CPT

## 2019-02-01 PROCEDURE — 82248 BILIRUBIN DIRECT: CPT

## 2019-02-01 PROCEDURE — 87077 CULTURE AEROBIC IDENTIFY: CPT

## 2019-02-01 PROCEDURE — 36415 COLL VENOUS BLD VENIPUNCTURE: CPT

## 2019-02-01 PROCEDURE — 85610 PROTHROMBIN TIME: CPT

## 2019-02-01 PROCEDURE — 81001 URINALYSIS AUTO W/SCOPE: CPT

## 2019-02-01 PROCEDURE — 74181 MRI ABDOMEN W/O CONTRAST: CPT

## 2019-02-01 PROCEDURE — 43277 ERCP EA DUCT/AMPULLA DILATE: CPT

## 2019-02-01 PROCEDURE — 80053 COMPREHEN METABOLIC PANEL: CPT

## 2019-02-01 PROCEDURE — 74018 RADEX ABDOMEN 1 VIEW: CPT

## 2019-02-01 PROCEDURE — 93306 TTE W/DOPPLER COMPLETE: CPT

## 2019-02-01 PROCEDURE — 43262 ENDO CHOLANGIOPANCREATOGRAPH: CPT

## 2019-02-01 PROCEDURE — 76770 US EXAM ABDO BACK WALL COMP: CPT

## 2019-02-01 PROCEDURE — 85025 COMPLETE CBC W/AUTO DIFF WBC: CPT

## 2019-02-01 PROCEDURE — 83880 ASSAY OF NATRIURETIC PEPTIDE: CPT

## 2019-02-01 NOTE — ED
General Adult HPI





- General


Chief complaint: Shortness of Breath


Stated complaint: tired from new medication


Time Seen by Provider: 02/01/19 14:08


Source: patient, family, RN notes reviewed


Mode of arrival: wheelchair


Limitations: no limitations





- History of Present Illness


Initial comments: 





Patient is a pleasant 78-year-old male presenting to the emergency department 

with fatigue and dyspnea.  Symptoms have progressed over several weeks.  

Patient is on treatment for stage IV metastatic prostate cancer.  Patient does 

have leg edema.  Patient feels fatigued and short of breath, especially with 

exertion.  No cough.  No fever.  Patient feels generally weak.  No isolated 

area of weakness.  No confusion.  Patient and family admit that he does appear 

jaundiced and states this is been going on for weeks or more.





- Related Data


 Home Medications











 Medication  Instructions  Recorded  Confirmed


 


amLODIPine BES/OLMESARTAN MED 1 tab PO DAILY 07/23/15 02/01/19





[Fabricio 5-40 mg Tablet]   


 


Cabozantinib S-Malate [Cabometyx] 20 mg PO DAILY 02/01/19 02/01/19


 


Cholecalciferol [Vitamin D3] 1,000 unit PO DAILY 02/01/19 02/01/19


 


Omeprazole 20 mg PO DAILY 02/01/19 02/01/19


 


Sulfamethox-Tmp 800-160Mg [Bactrim 1 tab PO Q12HR 02/01/19 02/01/19





-160 mg]   











 Allergies











Allergy/AdvReac Type Severity Reaction Status Date / Time


 


hydromorphone HCl Allergy  Confusion Verified 02/01/19 15:12





[From Dilaudid]     


 


Mushroom Allergy  Swelling Verified 02/01/19 15:12














Review of Systems


ROS Statement: 


Those systems with pertinent positive or pertinent negative responses have been 

documented in the HPI.





ROS Other: All systems not noted in ROS Statement are negative.


Constitutional: Denies: fever


Eyes: Denies: eye pain


ENT: Denies: ear pain


Respiratory: Reports: dyspnea


Cardiovascular: Denies: chest pain


Endocrine: Reports: fatigue


Gastrointestinal: Denies: abdominal pain


Genitourinary: Denies: dysuria


Musculoskeletal: Denies: back pain


Skin: Reports: as per HPI, rash


Neurological: Denies: weakness





Past Medical History


Past Medical History: Atrial Fibrillation, Cancer, Hypertension, Prostate 

Disorder, Skin Disorder


Additional Past Medical History / Comment(s): prostate  bladder and rectal CA, 

radiation 8-9 months ago; chemo finished 1 week ago; occ rash on nose; arthritis


History of Any Multi-Drug Resistant Organisms: None Reported


Past Surgical History: Bladder Surgery, Prostate Surgery


Additional Past Surgical History / Comment(s): prostate, bladder and rectum 

removal; ileostomy & urostomy; hernia repair; rotator cuff;.  FX Leg 35 yrs ago.


Past Anesthesia/Blood Transfusion Reactions: No Reported Reaction


Additional Past Anesthesia/Blood Transfusion Reaction / Comment(s): blood 

transfusion last month


Past Psychological History: No Psychological Hx Reported


Smoking Status: Former smoker


Past Alcohol Use History: None Reported


Past Drug Use History: None Reported





- Past Family History


  ** Brother(s)


Family Medical History: Cancer





General Exam


Limitations: no limitations


General appearance: alert, in no apparent distress


Head exam: Present: atraumatic


Eye exam: Present: scleral icterus


ENT exam: Present: normal oropharynx


Neck exam: Present: normal inspection


Respiratory exam: Present: normal lung sounds bilaterally


Cardiovascular Exam: Present: regular rate, irregular rhythm


GI/Abdominal exam: Present: soft.  Absent: distended, tenderness


Extremities exam: Present: normal inspection


Neurological exam: Present: alert, CN II-XII intact.  Absent: motor sensory 

deficit


Psychiatric exam: Present: normal affect, normal mood


Skin exam: Present: other (Jaundice)





Course


 Vital Signs











  02/01/19 02/01/19





  13:59 15:07


 


Temperature 98.1 F 98 F


 


Pulse Rate 52 L 112 H


 


Respiratory 20 20





Rate  


 


Blood Pressure 126/89 108/95


 


O2 Sat by Pulse 99 96





Oximetry  














EKG Findings





- EKG Comments:


EKG Findings:: A. fib with a rate of 79.  QRS 96.  .  QTc 545.  Left 

axis.  Septal Q waves.  No acute ST change.  Low QRS voltage.





Medical Decision Making





- Medical Decision Making





Patient reevaluated and unchanged.  Patient and family updated.  Case was 

discussed in detail with Dr. Diggs who would like ultrasound done.  He also would 

like computed tomography scan however is going to hold at this time secondary 

to creatinine level.  He does recommend cardiology consult and echo as well as 

nephrology consult.  Case was also discussed in detail with Dr. Martinez.  He does 

agree with ultrasound.  He states if there is sign of obstruction or dilated 

common bile duct patient may need MRCP if still able to get done today.  He 

will admit covering for Dr. Rick.





- Lab Data


Result diagrams: 


 02/01/19 14:35





 02/01/19 15:15


 Lab Results











  02/01/19 02/01/19 02/01/19 Range/Units





  14:05 14:35 14:35 


 


WBC   4.0   (3.8-10.6)  k/uL


 


RBC   2.98 L   (4.30-5.90)  m/uL


 


Hgb   9.2 L   (13.0-17.5)  gm/dL


 


Hct   30.2 L   (39.0-53.0)  %


 


MCV   101.3 H   (80.0-100.0)  fL


 


MCH   30.9   (25.0-35.0)  pg


 


MCHC   30.5 L   (31.0-37.0)  g/dL


 


RDW   18.2 H   (11.5-15.5)  %


 


Plt Count   162   (150-450)  k/uL


 


Neutrophils %   75   %


 


Lymphocytes %   13   %


 


Monocytes %   8   %


 


Eosinophils %   0   %


 


Basophils %   0   %


 


Neutrophils #   3.0   (1.3-7.7)  k/uL


 


Lymphocytes #   0.5 L   (1.0-4.8)  k/uL


 


Monocytes #   0.3   (0-1.0)  k/uL


 


Eosinophils #   0.0   (0-0.7)  k/uL


 


Basophils #   0.0   (0-0.2)  k/uL


 


Hypochromasia   Moderate   


 


Anisocytosis   Slight   


 


Macrocytosis   Moderate   


 


PT     (9.0-12.0)  sec


 


INR     (<1.2)  


 


APTT     (22.0-30.0)  sec


 


Sodium     (137-145)  mmol/L


 


Potassium     (3.5-5.1)  mmol/L


 


Chloride     ()  mmol/L


 


Carbon Dioxide     (22-30)  mmol/L


 


Anion Gap     mmol/L


 


BUN     (9-20)  mg/dL


 


Creatinine     (0.66-1.25)  mg/dL


 


Est GFR (CKD-EPI)AfAm     (>60 ml/min/1.73 sqM)  


 


Est GFR (CKD-EPI)NonAf     (>60 ml/min/1.73 sqM)  


 


Glucose     (74-99)  mg/dL


 


Calcium     (8.4-10.2)  mg/dL


 


Total Bilirubin     (0.2-1.3)  mg/dL


 


AST     (17-59)  U/L


 


ALT     (21-72)  U/L


 


Alkaline Phosphatase     ()  U/L


 


NT-Pro-B Natriuret Pep    7610  pg/mL


 


Total Protein     (6.3-8.2)  g/dL


 


Albumin     (3.5-5.0)  g/dL


 


Urine Color  Dark Brown    


 


Urine Appearance  Cloudy    (Clear)  


 


Urine pH  6.0    (5.0-8.0)  


 


Ur Specific Gravity  1.013    (1.001-1.035)  


 


Urine Protein  1+ H    (Negative)  


 


Urine Glucose (UA)  Negative    (Negative)  


 


Urine Ketones  Negative    (Negative)  


 


Urine Blood  Moderate H    (Negative)  


 


Urine Nitrite  Negative    (Negative)  


 


Urine Bilirubin  3+ H    (Negative)  


 


Urine Urobilinogen  2.0    (<2.0)  mg/dL


 


Ur Leukocyte Esterase  Negative    (Negative)  


 


Urine RBC  23 H    (0-5)  /hpf


 


Urine WBC  4    (0-5)  /hpf


 


Ur Squamous Epith Cells  <1    (0-4)  /hpf


 


Hyaline Casts  88 H    (0-2)  /lpf


 


Urine Mucus  Rare H    (None)  /hpf














  02/01/19 02/01/19 Range/Units





  15:15 15:15 


 


WBC    (3.8-10.6)  k/uL


 


RBC    (4.30-5.90)  m/uL


 


Hgb    (13.0-17.5)  gm/dL


 


Hct    (39.0-53.0)  %


 


MCV    (80.0-100.0)  fL


 


MCH    (25.0-35.0)  pg


 


MCHC    (31.0-37.0)  g/dL


 


RDW    (11.5-15.5)  %


 


Plt Count    (150-450)  k/uL


 


Neutrophils %    %


 


Lymphocytes %    %


 


Monocytes %    %


 


Eosinophils %    %


 


Basophils %    %


 


Neutrophils #    (1.3-7.7)  k/uL


 


Lymphocytes #    (1.0-4.8)  k/uL


 


Monocytes #    (0-1.0)  k/uL


 


Eosinophils #    (0-0.7)  k/uL


 


Basophils #    (0-0.2)  k/uL


 


Hypochromasia    


 


Anisocytosis    


 


Macrocytosis    


 


PT  13.2 H   (9.0-12.0)  sec


 


INR  1.3 H   (<1.2)  


 


APTT  29.2   (22.0-30.0)  sec


 


Sodium   135 L  (137-145)  mmol/L


 


Potassium   4.1  (3.5-5.1)  mmol/L


 


Chloride   108 H  ()  mmol/L


 


Carbon Dioxide   17 L  (22-30)  mmol/L


 


Anion Gap   10  mmol/L


 


BUN   23 H  (9-20)  mg/dL


 


Creatinine   1.71 H  (0.66-1.25)  mg/dL


 


Est GFR (CKD-EPI)AfAm   44  (>60 ml/min/1.73 sqM)  


 


Est GFR (CKD-EPI)NonAf   38  (>60 ml/min/1.73 sqM)  


 


Glucose   98  (74-99)  mg/dL


 


Calcium   7.2 L  (8.4-10.2)  mg/dL


 


Total Bilirubin   11.0 H  (0.2-1.3)  mg/dL


 


AST   111 H  (17-59)  U/L


 


ALT   44  (21-72)  U/L


 


Alkaline Phosphatase   331 H  ()  U/L


 


NT-Pro-B Natriuret Pep    pg/mL


 


Total Protein   6.4  (6.3-8.2)  g/dL


 


Albumin   2.7 L  (3.5-5.0)  g/dL


 


Urine Color    


 


Urine Appearance    (Clear)  


 


Urine pH    (5.0-8.0)  


 


Ur Specific Gravity    (1.001-1.035)  


 


Urine Protein    (Negative)  


 


Urine Glucose (UA)    (Negative)  


 


Urine Ketones    (Negative)  


 


Urine Blood    (Negative)  


 


Urine Nitrite    (Negative)  


 


Urine Bilirubin    (Negative)  


 


Urine Urobilinogen    (<2.0)  mg/dL


 


Ur Leukocyte Esterase    (Negative)  


 


Urine RBC    (0-5)  /hpf


 


Urine WBC    (0-5)  /hpf


 


Ur Squamous Epith Cells    (0-4)  /hpf


 


Hyaline Casts    (0-2)  /lpf


 


Urine Mucus    (None)  /hpf














- Radiology Data


Radiology results: image reviewed (Chest x-ray shows mild bilateral effusions.)





Disposition


Clinical Impression: 


 Congestive heart failure, Liver failure





Disposition: ADMITTED AS IP TO THIS HOSP


Is patient prescribed a controlled substance at d/c from ED?: No


Referrals: 


Eliezer Rick DO [Primary Care Provider] - 1-2 days


Decision Time: 16:20

## 2019-02-01 NOTE — US
EXAMINATION TYPE: US gallbladder

 

DATE OF EXAM: 2/1/2019

 

COMPARISON:

 

CLINICAL HISTORY: Evaluate liver and common bile duct. hx of liver cancer per patient.  Jaundice.  No
 pain. 

 

EXAM MEASUREMENTS:

 

Liver Length:  20.6 cm   

Gallbladder Wall:  0.4 cm   

CHD:  0.4 cm

Right Kidney:  9.9 x 3.8 x 5.8 cm

 

***Limited visualization due to overlying bowel gas

 

Pancreas:  Not well visualized.  Appears echogenic in appearance. Possible hypoechoic lesion vs surro
unding lymph node - 2.1 x 2.6 x 1.3 cm 

Liver:  Heterogenous and enlarged in size.  Multiple lesions visualized. Largest appears irregular sh
aped with internal debris and posterior enhancement= 7.4 x 5.0 x 5.8 cm    

Gallbladder:  Wall thickening.  No stones visualized. 

**Evidence for sonographic Russ's sign:  neg

CBD:  Obscured by overlying bowel gas 

CHD: portion seen appears wnl

Right Kidney:  possible upper pole lateral cystic cluster - 1.5 x 0.9 x 1.1 cm.  Lower pole not visua
lized.    

 

***Free fluid seen adjacent to liver

 

IMPRESSION: No gallstones. Ascites. Extensive heterogeneity in the liver consistent with tumor. Mild 
gallbladder wall thickening. Common bile duct was not identified. I see no dilated intrahepatic bile 
ducts.

## 2019-02-01 NOTE — XR
EXAMINATION TYPE: XR chest 2V

 

DATE OF EXAM: 2/1/2019

 

COMPARISON: Prior chest x-ray 8/11/2017

 

HISTORY: Difficulty breathing, shortness of breath and lower extremity edema

 

TECHNIQUE:  Frontal and lateral views of the chest are obtained.

 

FINDINGS:  There is blunting the posterior costophrenic angles. Right-sided Port-A-Cath remains in pl
ace. No evident pneumothorax. Heart size is stable. Chest wall abnormalities are stable. Arthropathy 
noted in the shoulders. Aorta is dense.

 

IMPRESSION:  Probable basilar atelectasis and effusions.

## 2019-02-02 LAB
ALBUMIN SERPL-MCNC: 2.5 G/DL (ref 3.5–5)
ALP SERPL-CCNC: 334 U/L (ref 38–126)
ALT SERPL-CCNC: 47 U/L (ref 21–72)
ANION GAP SERPL CALC-SCNC: 10 MMOL/L
AST SERPL-CCNC: 114 U/L (ref 17–59)
BILIRUB INDIRECT SERPL-MCNC: 1.4 MG/DL (ref 0–1.1)
BILIRUBIN DIRECT+TOT PNL SERPL-MCNC: 4.4 MG/DL (ref 0–0.2)
BUN SERPL-SCNC: 28 MG/DL (ref 9–20)
CALCIUM SPEC-MCNC: 7 MG/DL (ref 8.4–10.2)
CHLORIDE SERPL-SCNC: 108 MMOL/L (ref 98–107)
CO2 SERPL-SCNC: 18 MMOL/L (ref 22–30)
GLUCOSE SERPL-MCNC: 102 MG/DL (ref 74–99)
POTASSIUM SERPL-SCNC: 4 MMOL/L (ref 3.5–5.1)
PROT SERPL-MCNC: 6.2 G/DL (ref 6.3–8.2)
SODIUM SERPL-SCNC: 136 MMOL/L (ref 137–145)

## 2019-02-02 RX ADMIN — TEMAZEPAM PRN MG: 15 CAPSULE ORAL at 21:08

## 2019-02-02 RX ADMIN — PANTOPRAZOLE SODIUM SCH MG: 40 TABLET, DELAYED RELEASE ORAL at 06:51

## 2019-02-02 RX ADMIN — POTASSIUM CHLORIDE SCH MLS/HR: 14.9 INJECTION, SOLUTION INTRAVENOUS at 13:29

## 2019-02-02 RX ADMIN — Medication SCH UNIT: at 12:49

## 2019-02-02 NOTE — P.HPIM
History of Present Illness


H&P Date: 19


Chief Complaint: fatigue and dyspnea


This is a 78-year-old male, patient of Dr. Saavedra.  He has a medical 

history of prostate cancer that was diagnosed in  status post prostatectomy 

with radiation treatments.  Patient was in remission until  when he 

developed hematuria, at that time he was found to have a bladder mass and 

underwent resection with colostomy and urostomy.  He is receiving current 

therapy with Cabozantinib and sees oncology on regular basis.  Patient reports 

about a week ago he discontinued the Cabozantinib due to diarrhea, nausea, and 

vomiting.  He did call his oncologist office who advised him to go to the 

emergency room to be evaluated.  Patient's lab revealed hemoglobin 9.2, BUN 28, 

creatinine 1.91, total bilirubin 10.6, alk phos 334.  Ultrasound showed no 

gallstones, ascites, extensive heterogenicity in the liver, consistent with 

tumor, and mild gallbladder wall thickening.  Echocardiogram revealed atrial 

fibrillation, borderline left ventricular hypertrophy, ejection fraction 55-60%

, moderate aortic stenosis, mild aortic regurgitation, no evidence of pulmonary 

hypertension, large pleural effusion.  Chest x-ray showed atelectasis and 

effusions.  Cardiology consulted, as well as nephrology and oncology. 








Review of Systems


Constitutional: Reports fatigue, Reports weakness, Denies chills, Denies fever


Cardiovascular: Denies chest pain, Denies orthopnea, Denies palpitations, 

Denies syncope


Respiratory: Reports dyspnea, Denies cough, Denies hemoptysis, Denies pain, 

Denies wheezing


Gastrointestinal: Reports diarrhea, Reports loss of appetite, Reports nausea, 

Reports vomiting, Denies abdominal pain, Denies constipation, Denies indigestion


Genitourinary: Denies flank pain, Denies hematuria, Denies urinary frequency, 

Denies urinary hesitancy, Denies urinary retention


Musculoskeletal: Reports muscle weakness, Denies atrophy, Denies myalgias, 

Denies neck pain, Denies neck stiffness


Integumentary: Reports darkening of skin, Denies pruritus, Denies rash, Denies 

wounds


Neurological: Reports weakness, Denies confusion, Denies headaches, Denies 

paralysis, Denies paresthesias, Denies seizures, Denies syncope, Denies visual 

changes


Psychiatric: Denies anxiety, Denies confusion, Denies insomnia, Denies 

irritability


Endocrine: Reports fatigue, Denies nocturia, Denies palpitations





Past Medical History


Past Medical History: Atrial Fibrillation, Cancer, Hypertension, Prostate 

Disorder, Skin Disorder


Additional Past Medical History / Comment(s): metastatic prostate cancer -found 

also in bladder and rectal CA, completed chemo/radiation, arthritis, rt ear 

tinnitus. rx rt leg 37 years ago and 2016 rt femur fx(sx)


History of Any Multi-Drug Resistant Organisms: None Reported


Past Surgical History: Bladder Surgery, Hernia Repair, Prostate Surgery


Additional Past Surgical History / Comment(s): prostate removed , bladder and 

rectum sx pt now has ileostomy & urostomy; hernia repair; rotator cuff;medi 

port rt chest.  FX Leg 35 yrs ago, 2016 orif rt femur/plate d/t pathelogical fx


Past Anesthesia/Blood Transfusion Reactions: No Reported Reaction


Additional Past Anesthesia/Blood Transfusion Reaction / Comment(s): blood 

transfusion no reaction


Smoking Status: Former smoker





- Past Family History


  ** Brother(s)


Family Medical History: Cancer, Myocardial Infarction (MI)





  ** Mother


Additional Family Medical History / Comment(s):  from complications from a 

blood transfusion





  ** Father


Additional Family Medical History / Comment(s):  in motorcycle accident





Medications and Allergies


 Home Medications











 Medication  Instructions  Recorded  Confirmed  Type


 


amLODIPine BES/OLMESARTAN MED 1 tab PO DAILY 07/23/15 02/01/19 History





[Fabricio 5-40 mg Tablet]    


 


Cabozantinib S-Malate [Cabometyx] 20 mg PO DAILY 19 History


 


Cholecalciferol [Vitamin D3] 1,000 unit PO DAILY 19 History


 


Pantoprazole Sodium [Protonix] 40 mg PO DAILY 19 History


 


Sulfamethox-Tmp 800-160Mg [Bactrim 1 tab PO Q12HR 19 History





-160 mg]    











 Allergies











Allergy/AdvReac Type Severity Reaction Status Date / Time


 


hydromorphone HCl Allergy  Confusion Verified 19 15:12





[From Dilaudid]     


 


Mushroom Allergy  Swelling Verified 19 15:12














Physical Exam


Vitals: 


 Vital Signs











  Temp Pulse Pulse Resp BP BP Pulse Ox


 


 19 09:10    103 H    


 


 19 09:08  98.2 F   103 H  18   89/63  97


 


 02/02/19 04:00  98.4 F   88  18   91/55  96


 


 19 00:00  98.1 F   104 H  18   92/54  95


 


 19 20:20  97.6 F   88  18   106/65  95


 


 19 20:00   108 H   21  106/64   97


 


 19 19:00   108 H   15  103/69  


 


 19 18:38   110 H   18  103/69   98


 


 19 17:02   79   20  110/62   98


 


 19 15:07  98 F  112 H   20  108/95   96


 


 19 13:59  98.1 F  52 L   20  126/89   99








 Intake and Output











 19





 22:59 06:59 14:59


 


Intake Total   140


 


Balance   140


 


Intake:   


 


  Oral   140


 


Other:   


 


  # Voids   1














- Constitutional


General appearance: cooperative, no acute distress





- EENT


Eyes: EOMI, PERRLA, scleral icterus


ENT: hearing grossly normal, normal oropharynx





- Neck


Neck: no lymphadenopathy, normal ROM, no stridor, no thyromegaly


Thyroid: bilateral: normal size, negative: firm, nodule





- Respiratory


Respiratory: bilateral: CTA, negative: dullness, rales, rhonchi, wheezing





- Cardiovascular





Bilateral lower extremity edema


Rhythm: irregularly irregular


Heart sounds: normal: S1, S2





- Gastrointestinal





Colostomy and urostomy


General gastrointestinal: no distended, normal bowel sounds, soft, no tenderness





- Integumentary


Integumentary: jaundiced





- Neurologic


Neurologic: CNII-XII intact





- Musculoskeletal


Musculoskeletal: generalized weakness, strength equal bilaterally, no right 

sided weakness, no left sided weakness





- Psychiatric


Psychiatric: A&O x's 3





Results


CBC & Chem 7: 


 19 14:35





 19 07:09


Labs: 


 Abnormal Lab Results - Last 24 Hours (Table)











  19 Range/Units





  14:05 14:35 15:15 


 


RBC   2.98 L   (4.30-5.90)  m/uL


 


Hgb   9.2 L   (13.0-17.5)  gm/dL


 


Hct   30.2 L   (39.0-53.0)  %


 


MCV   101.3 H   (80.0-100.0)  fL


 


MCHC   30.5 L   (31.0-37.0)  g/dL


 


RDW   18.2 H   (11.5-15.5)  %


 


Lymphocytes #   0.5 L   (1.0-4.8)  k/uL


 


PT    13.2 H  (9.0-12.0)  sec


 


INR    1.3 H  (<1.2)  


 


Sodium     (137-145)  mmol/L


 


Chloride     ()  mmol/L


 


Carbon Dioxide     (22-30)  mmol/L


 


BUN     (9-20)  mg/dL


 


Creatinine     (0.66-1.25)  mg/dL


 


Glucose     (74-99)  mg/dL


 


Calcium     (8.4-10.2)  mg/dL


 


Total Bilirubin     (0.2-1.3)  mg/dL


 


Conjugated Bilirubin     (0.0-0.3)  mg/dL


 


Unconjugated Bilirubin     (0.0-1.1)  mg/dL


 


Delta Bilirubin     (0.0-0.2)  mg/dL


 


AST     (17-59)  U/L


 


Alkaline Phosphatase     ()  U/L


 


Total Protein     (6.3-8.2)  g/dL


 


Albumin     (3.5-5.0)  g/dL


 


Urine Protein  1+ H    (Negative)  


 


Urine Blood  Moderate H    (Negative)  


 


Urine Bilirubin  3+ H    (Negative)  


 


Urine RBC  23 H    (0-5)  /hpf


 


Hyaline Casts  88 H    (0-2)  /lpf


 


Urine Mucus  Rare H    (None)  /hpf














  19 Range/Units





  15:15 07:09 


 


RBC    (4.30-5.90)  m/uL


 


Hgb    (13.0-17.5)  gm/dL


 


Hct    (39.0-53.0)  %


 


MCV    (80.0-100.0)  fL


 


MCHC    (31.0-37.0)  g/dL


 


RDW    (11.5-15.5)  %


 


Lymphocytes #    (1.0-4.8)  k/uL


 


PT    (9.0-12.0)  sec


 


INR    (<1.2)  


 


Sodium  135 L  136 L  (137-145)  mmol/L


 


Chloride  108 H  108 H  ()  mmol/L


 


Carbon Dioxide  17 L  18 L  (22-30)  mmol/L


 


BUN  23 H  28 H  (9-20)  mg/dL


 


Creatinine  1.71 H  1.91 H  (0.66-1.25)  mg/dL


 


Glucose   102 H  (74-99)  mg/dL


 


Calcium  7.2 L  7.0 L  (8.4-10.2)  mg/dL


 


Total Bilirubin  11.0 H  10.6 H  (0.2-1.3)  mg/dL


 


Conjugated Bilirubin   4.8 H  (0.0-0.3)  mg/dL


 


Unconjugated Bilirubin   1.4 H  (0.0-1.1)  mg/dL


 


Delta Bilirubin   4.4 H  (0.0-0.2)  mg/dL


 


AST  111 H  114 H  (17-59)  U/L


 


Alkaline Phosphatase  331 H  334 H  ()  U/L


 


Total Protein   6.2 L  (6.3-8.2)  g/dL


 


Albumin  2.7 L  2.5 L  (3.5-5.0)  g/dL


 


Urine Protein    (Negative)  


 


Urine Blood    (Negative)  


 


Urine Bilirubin    (Negative)  


 


Urine RBC    (0-5)  /hpf


 


Hyaline Casts    (0-2)  /lpf


 


Urine Mucus    (None)  /hpf








 Microbiology - Last 24 Hours (Table)











 19 14:05 Urine Culture - Preliminary





 Urine,Suprapubic 














Thrombosis Risk Factor Assmnt





- Choose All That Apply


Each Factor Represents 1 point: Swollen legs (current)


Each Risk Factor Represents 3 Points: Age 75 years or older


Thrombosis Risk Factor Assessment Total Risk Factor Score: 4


Thrombosis Risk Factor Assessment Level: Moderate Risk





Assessment and Plan


Plan: 


1: Prostate cancer with metastasis disease with liver metastasis and hepatic 

failure: Ultrasound showed widespread liver metastasis, oncology on consult





2.  Congestive heart failure: Cardiology on consult, echocardiogram resulted





3.  Acute kidney injury secondary to dehydration from poor oral intake.  Will 

continue gentle hydration with normal saline at 50 ml per hour, nephrology on 

consult





4.  Hypertension: Continue with Norvasc 5 mg daily as well as Cozaar 150 mg 

daily





5.  Urinary tract infection: We'll started on Bactrim as outpatient will 

continue, urine cultures are pending





6.  Paroxysmal atrial fibrillation: Currently rate controlled





6.  GI, DVT prophylaxis: Continue Protonix, SEDs 








The above impression and plan of care have been discussed and directed by 

signing physician. Teena Thomas nurse practitioner acting as scribe for 

signing physician.

## 2019-02-02 NOTE — P.CRDCN
History of Present Illness


Consult date: 19


Requesting physician: Chandra Martinez


Consult reason: congestive heart failure


Chief complaint: Diarrhea


History of present illness: 


Is is a 78-year-old  gentleman with known history of prostate cancer 

treated with radical prostatectomy followed by radiation, treatment was 

completed in , apparently in  he was found to have a bladder mass and 

underwent radical surgery with resection including a colostomy and year ostomy 

at [facility].  According to oncology's notes, recurrent malignancy was present 

and patient is going through many lines of different therapies.  Most recently 

he has been on targeted therapy with Cabozantinib.  Patient does have a known 

history of paroxysmal atrial fibrillation as well as hypertension.  Patient 

presents to the hospital on this admission with symptoms of increased diarrhea 

with associated nausea, decreased appetite.  Cardiology consultation was 

requested for congestive cardiac failure.  According to the patient, he has 

noticed himself to be more short of breath than usual and over the past few 

weeks has been retaining a significant amount of bilateral peripheral edema 

chest x-ray on admission here showed probable basilar atelectasis and 

effusions.  Ultrasound of the gallbladder does not reveal any gallstones, 

positive ascites, extensive heterogeneous in the liver consistent with a tumor.

  Mild gallbladder wall thickening.  EKG shows atrial fibrillation with 

controlled ventricular response.  Echocardiogram with Doppler study revealed an 

ejection fraction of 55-60%, moderate aortic valve sclerosis moderate aortic 

stenosis small pericardial effusion with large pleural effusion..  Blood 

pressure 90/50 with a heart rate in the 80s, 96% on room air.  White blood cell 

count 4.0, hemoglobin 9.2, platelet count 162.  Sodium 136, potassium 4.0, BUN 

28 and creatinine 1.9 on admission creatinine was 1.7.  Calcium 7.2 total 

bilirubin 11  ALT 47 alk phos 334, conjugated bilirubin 4.8 on 

conjugated 1.4 delta bilirubin 4.4 total protein 6.2 of human 2.5.  BNP level 

7610.  At the time of my examination this morning, patient feels extremely weak

, mild shortness of breath.  Mild abdominal pain.





Past Medical History


Past Medical History: Atrial Fibrillation, Cancer, Hypertension, Prostate 

Disorder, Skin Disorder


Additional Past Medical History / Comment(s): metastatic prostate cancer -found 

also in bladder and rectal CA, completed chemo/radiation, arthritis, rt ear 

tinnitus. rx rt leg 37 years ago and 2016 rt femur fx(sx)


History of Any Multi-Drug Resistant Organisms: None Reported


Past Surgical History: Bladder Surgery, Hernia Repair, Prostate Surgery


Additional Past Surgical History / Comment(s): prostate removed , bladder and 

rectum sx pt now has ileostomy & urostomy; hernia repair; rotator cuff;medi 

port rt chest.  FX Leg 35 yrs ago, 2016 orif rt femur/plate d/t pathelogical fx


Past Anesthesia/Blood Transfusion Reactions: No Reported Reaction


Additional Past Anesthesia/Blood Transfusion Reaction / Comment(s): blood 

transfusion no reaction


Smoking Status: Former smoker





- Past Family History


  ** Brother(s)


Family Medical History: Cancer





  ** Mother


Additional Family Medical History / Comment(s):  from complications from a 

blood transfusion





  ** Father


Additional Family Medical History / Comment(s):  in motorcycle accident





Medications and Allergies


 Home Medications











 Medication  Instructions  Recorded  Confirmed  Type


 


amLODIPine BES/OLMESARTAN MED 1 tab PO DAILY 07/23/15 02/01/19 History





[Fabricio 5-40 mg Tablet]    


 


Cabozantinib S-Malate [Cabometyx] 20 mg PO DAILY 19 History


 


Cholecalciferol [Vitamin D3] 1,000 unit PO DAILY 19 History


 


Pantoprazole Sodium [Protonix] 40 mg PO DAILY 19 History


 


Sulfamethox-Tmp 800-160Mg [Bactrim 1 tab PO Q12HR 19 History





-160 mg]    











 Allergies











Allergy/AdvReac Type Severity Reaction Status Date / Time


 


hydromorphone HCl Allergy  Confusion Verified 19 15:12





[From Dilaudid]     


 


Mushroom Allergy  Swelling Verified 19 15:12














Physical Exam


Vitals: 


 Vital Signs











  Temp Pulse Pulse Resp BP BP Pulse Ox


 


 19 09:10    103 H    


 


 19 09:08  98.2 F   103 H  18   89/63  97


 


 19 04:00  98.4 F   88  18   91/55  96


 


 19 00:00  98.1 F   104 H  18   92/54  95


 


 19 20:20  97.6 F   88  18   106/65  95


 


 19 20:00   108 H   21  106/64   97


 


 19 19:00   108 H   15  103/69  


 


 19 18:38   110 H   18  103/69   98


 


 19 17:02   79   20  110/62   98


 


 19 15:07  98 F  112 H   20  108/95   96


 


 19 13:59  98.1 F  52 L   20  126/89   99








 Intake and Output











 19





 22:59 06:59 14:59


 


Intake Total   140


 


Balance   140


 


Intake:   


 


  Oral   140


 


Other:   


 


  # Voids   1














PHYSICAL EXAMINATION: 





GENERAL: 78-year-old  gentleman, appears quite weak and frail, 

jaundiced, in no acute distress at the time of my examination





HEENT: Head is atraumatic, normocephalic.  Pupils equal, round.  Sclera 

anicteric. Conjunctiva jaundiced .  Mucous membranes of the mouth are moist.  

Neck is supple.  There is no elevated jugular venous pressure.  No carotid  

bruit is heard.





HEART EXAMINATION: Heart S1 and S2 irregularly irregular a systolic murmur is 

heard





CHEST EXAMINATION: Lungs reveal diminished air entry bilaterally





ABDOMEN:  Soft, nontender. Bowel sounds are heard. No organomegaly noted.


 


EXTREMITIES: 2+ peripheral pulses with 2+  evidence of peripheral edema and no 

calf tenderness noted.





NEUROLOGIC patient is awake, alert and oriented 3 .


 


.


 








Results





 19 14:35





 19 07:09


 Cardiac Enzymes











  19 Range/Units





  15:15 07:09 


 


AST  111 H  114 H  (17-59)  U/L








 Coagulation











  19 Range/Units





  15:15 


 


PT  13.2 H  (9.0-12.0)  sec


 


APTT  29.2  (22.0-30.0)  sec








 CBC











  19 Range/Units





  14:35 


 


WBC  4.0  (3.8-10.6)  k/uL


 


RBC  2.98 L  (4.30-5.90)  m/uL


 


Hgb  9.2 L  (13.0-17.5)  gm/dL


 


Hct  30.2 L  (39.0-53.0)  %


 


Plt Count  162  (150-450)  k/uL








 Comprehensive Metabolic Panel











  19 Range/Units





  15:15 07:09 


 


Sodium  135 L  136 L  (137-145)  mmol/L


 


Potassium  4.1  4.0  (3.5-5.1)  mmol/L


 


Chloride  108 H  108 H  ()  mmol/L


 


Carbon Dioxide  17 L  18 L  (22-30)  mmol/L


 


BUN  23 H  28 H  (9-20)  mg/dL


 


Creatinine  1.71 H  1.91 H  (0.66-1.25)  mg/dL


 


Glucose  98  102 H  (74-99)  mg/dL


 


Calcium  7.2 L  7.0 L  (8.4-10.2)  mg/dL


 


Unconjugated Bilirubin   1.4 H  (0.0-1.1)  mg/dL


 


AST  111 H  114 H  (17-59)  U/L


 


ALT  44  47  (21-72)  U/L


 


Alkaline Phosphatase  331 H  334 H  ()  U/L


 


Total Protein  6.4  6.2 L  (6.3-8.2)  g/dL


 


Albumin  2.7 L  2.5 L  (3.5-5.0)  g/dL








 Current Medications











Generic Name Dose Route Start Last Admin





  Trade Name Freq  PRN Reason Stop Dose Admin


 


Amlodipine Besylate  5 mg  19 09:00  19 09:08





  Norvasc  PO   Not Given





  DAILY TRISH   





     





     





     





     


 


Cholecalciferol  1,000 unit  19 12:00  





  Vitamin D3  PO   





  1200 TRISH   





     





     





     





     


 


Sodium Chloride  1,000 mls @ 50 mls/hr  19 21:15  19 22:23





  Saline 0.9%  IV   Not Given





  .Q20H TRISH   





     





     





     





     


 


Losartan Potassium  150 mg  19 09:00  19 09:08





  Cozaar  PO   Not Given





  DAILY TRISH   





     





     





     





     


 


Melatonin  5 mg  19 21:50  19 22:00





  Melatonin  PO   5 mg





  HS PRN   Administration





  Insomnia   





     





     





     


 


Pantoprazole Sodium  40 mg  19 07:30  19 06:51





  Protonix  PO   40 mg





  AC-BRKFST TRISH   Administration





     





     





     





     


 


Trimethoprim/Sulfamethoxazole  1 each  19 09:00  19 09:04





  Bactrim Ds  PO  19 21:01  1 each





  Q12HR TRISH   Administration





     





     





     





     








 Intake and Output











 19





 22:59 06:59 14:59


 


Intake Total   140


 


Balance   140


 


Intake:   


 


  Oral   140


 


Other:   


 


  # Voids   1








 





 19 14:35 





 19 07:09 











EKG Interpretations (text)





EKG shows atrial fibrillation with a controlled ventricular response.





Assessment and Plan


Plan: 


Assessment and plan


#1 symptoms of persistent diarrhea, abdominal discomfort and vomiting.  Marked 

increase in bilirubin as compared with a couple of months ago in a patient with 

known liver metastases


#2 paroxysmal atrial fibrillation, not a candidate for anticoagulation


#3 history of prostate cancer, bladder CA, rectal CA


#4 hypertension


#5 congestive heart failure diastolic acute on chronic








Plan


Cardiology's perspective, we would recommend diuresing the patient with some IV 

Lasix monitoring renal function closely.  He is not a candidate for 

anticoagulation.  Continue Norvasc and losartan for blood pressure management.








DNP note has been reviewed, I agree with a documented findings and plan of 

care.  Patient was seen and examined.

## 2019-02-02 NOTE — ECHOF
Referral Reason:Heart Failure



MEASUREMENTS

--------

HEIGHT: 147.3 cm

WEIGHT: 77.1 kg

BP: 112/58

IVSd:   1.1 cm     (0.6 - 1.1)

LVIDd:   4.9 cm     (3.9 - 5.3)

LVPWd:   1.1 cm     (0.6 - 1.1)

IVSs:   1.1 cm

LVIDs:   3.8 cm

LVPWs:   1.2 cm

RVIDd:   2.5 cm     (< 3.3)

LAESV Index (A-L):   34.87 ml/m

MV E Oliver:   0.38 m/s

MV DecT:   276 ms

MV A Oliver:   1.06 m/s

MV E/A Ratio:   0.36 

AV maxP.29 mmHg

AV meanP.03 mmHg

RAP:   5.00 mmHg

RVSP:   11.81 mmHg







FINDINGS

--------

Atrial fibrillation.

This was a technically adequate study.

The left ventricular size is normal.   There is borderline concentric left ventricular hypertrophy.  
 Overall left ventricular systolic function is normal with, an EF between 55 - 60 %.

The right ventricle is normal in size and function.

LA is moderately dilated 34-39 ml/m2

The right atrium is normal in size.

There is moderate aortic valve sclerosis.   There is mild aortic regurgitation.   There is moderate a
ortic stenosis present.   Peak/mean gradient across the Aortic Valve is 61.29mmHg / 36.03mmHg.

The mitral valve leaflets are mildly thickened.   Mild mitral annular calcification present.   There 
is trace to mild mitral regurgitation.

Trace tricuspid regurgitation present.   Right ventricular systolic pressure is normal at < 35 mmHg. 
  There is no evidence of pulmonary hypertension.

The pulmonic valve was not well visualized.

The aortic root size is normal.

IVC Not well visulized.

There is a small pericardial effusion located near the left ventricle.   Large Pleural Effusion.



CONCLUSIONS

--------

1. Atrial fibrillation.

2. This was a technically adequate study.

3. The left ventricular size is normal.

4. There is borderline concentric left ventricular hypertrophy.

5. Overall left ventricular systolic function is normal with, an EF between 55 - 60 %.

6. LA is moderately dilated 34-39 ml/m2

7. The right atrium is normal in size.

8. There is moderate aortic valve sclerosis.

9. There is mild aortic regurgitation.

10. There is moderate aortic stenosis present.

11. Peak/mean gradient across the Aortic Valve is 61.29mmHg / 36.03mmHg.

12. The mitral valve leaflets are mildly thickened.

13. Mild mitral annular calcification present.

14. There is trace to mild mitral regurgitation.

15. Trace tricuspid regurgitation present.

16. Right ventricular systolic pressure is normal at < 35 mmHg.

17. There is no evidence of pulmonary hypertension.

18. The pulmonic valve was not well visualized.

19. The aortic root size is normal.

20. IVC Not well visulized.

21. There is a small pericardial effusion located near the left ventricle.

22. Large Pleural Effusion.





SONOGRAPHER: Art Bishop RDCS

## 2019-02-02 NOTE — P.CONS
History of Present Illness





- Reason for Consult


Consult date: 19


Metastatic prostate cancer.  Progressive weakness





- History of Present Illness





  The patient is a 78-year-old white male, well known to our service.  He was 

initially diagnosed with prostate cancer in , and treated with radical 

prostatectomy, followed by radiation.  He completed treatment in early .  

The patient then remained disease-free until about 5/15, when he developed 

hematuria.  At that time was found to have a bladder mass, and underwent 

radical surgery with resection including creation of a colostomy and urostomy, 

at [facility], in 6/15.  Recurrent malignancy was consistent 

with prostate cancer. 


 The pt has progressed through multiple lines of therapy. These include LHRH 

agonist, Xtandi, Taxotere , Cabazitaxel and Mitoxantrone, as well as  16 and 

Carboplatin ( which was given due to concerns that his cancer may be of neuro 

endocrine origin) Most recently he has been on targeted therapy with 

Cabozantinib. 





  The patient has been taking Cabozantinib regularly, till about a week prior 

to admission.  At that time he discontinued it, as he was having increased 

diarrhea, as well as nausea, decreased appetite and indigestion.  Despite 

stopping the medication, he only had minor improvement in his symptoms, 

transiently.  Diarrhea did improve, the other GI symptoms continued to persist 

and progress.  He also developed increasing swelling in his lower extremities.  

In addition, the patient reported some shortness of breath, though he denied 

any orthopnea or chest pain.


  His family had called the office because of his progressive symptoms.  The 

patient was reluctant to come into the hospital, but ultimately agreed to after 

repeated urging.  Case was discussed in detail with the emergency room 

physician.  The patient's labs showed marked increase in his bilirubin to 11, 

as well as increase in his creatinine in the 1.7 range.  Labs done in the 

office in mid , had shown a normal bilirubin, as well as normal creatinine 

of 0.9.  Chest x-ray showed small bilateral pleural effusions.  The patient was 

therefore admitted for further management.





Review of Systems


Constitutional: Reports anorexia, Reports fatigue, Reports lethargy, Reports 

weakness


Eyes: denies blurred vision, denies pain


Ears: deny: decreased hearing, ear discharge, earache, tinnitus


Ears, nose, mouth and throat: Denies headache, Denies sore throat


Cardiovascular: Reports dyspnea on exertion, Reports leg edema


Respiratory: Reports dyspnea


Gastrointestinal: Reports abdominal pain, Reports diarrhea, Reports dyspepsia, 

Reports nausea


Genitourinary: Reports as per HPI


Musculoskeletal: Reports muscle weakness


Integumentary: Denies pruritus, Denies rash


Neurological: Reports weakness


Psychiatric: Denies anxiety, Denies depression


Endocrine: Reports fatigue, Reports weight change


Hematologic/Lymphatic: Reports as per HPI





Past Medical History


Past Medical History: Atrial Fibrillation, Cancer, Hypertension, Prostate 

Disorder, Skin Disorder


Additional Past Medical History / Comment(s): metastatic prostate cancer -found 

also in bladder and rectal CA, completed chemo/radiation, arthritis, rt ear 

tinnitus. rx rt leg 37 years ago and 2016 rt femur fx(sx)


History of Any Multi-Drug Resistant Organisms: None Reported


Past Surgical History: Bladder Surgery, Hernia Repair, Prostate Surgery


Additional Past Surgical History / Comment(s): prostate removed , bladder and 

rectum sx pt now has ileostomy & urostomy; hernia repair; rotator cuff;medi 

port rt chest.  FX Leg 35 yrs ago, 2016 orif rt femur/plate d/t pathelogical fx


Past Anesthesia/Blood Transfusion Reactions: No Reported Reaction


Additional Past Anesthesia/Blood Transfusion Reaction / Comm: blood transfusion 

no reaction


Smoking Status: Former smoker





- Past Family History


  ** Brother(s)


Family Medical History: Cancer





  ** Mother


Additional Family Medical History / Comment(s):  from complications from a 

blood transfusion





  ** Father


Additional Family Medical History / Comment(s):  in motorcycle accident





Medications and Allergies


 Home Medications











 Medication  Instructions  Recorded  Confirmed  Type


 


amLODIPine BES/OLMESARTAN MED 1 tab PO DAILY 07/23/15 02/01/19 History





[Fabricio 5-40 mg Tablet]    


 


Cabozantinib S-Malate [Cabometyx] 20 mg PO DAILY 19 History


 


Cholecalciferol [Vitamin D3] 1,000 unit PO DAILY 19 History


 


Pantoprazole Sodium [Protonix] 40 mg PO DAILY 19 History


 


Sulfamethox-Tmp 800-160Mg [Bactrim 1 tab PO Q12HR 19 History





-160 mg]    











 Allergies











Allergy/AdvReac Type Severity Reaction Status Date / Time


 


hydromorphone HCl Allergy  Confusion Verified 19 15:12





[From Dilaudid]     


 


Mushroom Allergy  Swelling Verified 19 15:12














Physical Exam


Vitals: 


 Vital Signs











  Temp Pulse Resp BP Pulse Ox


 


 19 20:00   108 H  21  106/64  97


 


 19 19:00   108 H  15  103/69 


 


 19 18:38   110 H  18  103/69  98


 


 19 17:02   79  20  110/62  98


 


 19 15:07  98 F  112 H  20  108/95  96


 


 19 13:59  98.1 F  52 L  20  126/89  99








 Intake and Output











 19





 06:59 14:59 22:59


 


Other:   


 


  Weight  77.337 kg 














- Constitutional


General appearance: no acute distress





- EENT


Eyes: scleral icterus


ENT: hearing grossly normal, normal oropharynx





- Neck


Neck: no lymphadenopathy


Thyroid: bilateral: normal size





- Respiratory


Respiratory: bilateral: diminished





- Cardiovascular


Rhythm: regular


Heart sounds: normal: S1, S2





- Gastrointestinal


General gastrointestinal: normal bowel sounds, soft





- Integumentary


Integumentary: jaundiced





- Neurologic


Neurologic: CNII-XII intact





- Musculoskeletal





Bilateral lower extremity edema


Musculoskeletal: generalized weakness, strength equal bilaterally





- Psychiatric


Psychiatric: A&O x's 3, appropriate affect





Results


CBC & Chem 7: 


 19 14:35





 19 07:09


Labs: 


 Abnormal Lab Results - Last 24 Hours (Table)











  19 Range/Units





  14:05 14:35 15:15 


 


RBC   2.98 L   (4.30-5.90)  m/uL


 


Hgb   9.2 L   (13.0-17.5)  gm/dL


 


Hct   30.2 L   (39.0-53.0)  %


 


MCV   101.3 H   (80.0-100.0)  fL


 


MCHC   30.5 L   (31.0-37.0)  g/dL


 


RDW   18.2 H   (11.5-15.5)  %


 


Lymphocytes #   0.5 L   (1.0-4.8)  k/uL


 


PT    13.2 H  (9.0-12.0)  sec


 


INR    1.3 H  (<1.2)  


 


Sodium     (137-145)  mmol/L


 


Chloride     ()  mmol/L


 


Carbon Dioxide     (22-30)  mmol/L


 


BUN     (9-20)  mg/dL


 


Creatinine     (0.66-1.25)  mg/dL


 


Calcium     (8.4-10.2)  mg/dL


 


Total Bilirubin     (0.2-1.3)  mg/dL


 


AST     (17-59)  U/L


 


Alkaline Phosphatase     ()  U/L


 


Albumin     (3.5-5.0)  g/dL


 


Urine Protein  1+ H    (Negative)  


 


Urine Blood  Moderate H    (Negative)  


 


Urine Bilirubin  3+ H    (Negative)  


 


Urine RBC  23 H    (0-5)  /hpf


 


Hyaline Casts  88 H    (0-2)  /lpf


 


Urine Mucus  Rare H    (None)  /hpf














  19 Range/Units





  15:15 


 


RBC   (4.30-5.90)  m/uL


 


Hgb   (13.0-17.5)  gm/dL


 


Hct   (39.0-53.0)  %


 


MCV   (80.0-100.0)  fL


 


MCHC   (31.0-37.0)  g/dL


 


RDW   (11.5-15.5)  %


 


Lymphocytes #   (1.0-4.8)  k/uL


 


PT   (9.0-12.0)  sec


 


INR   (<1.2)  


 


Sodium  135 L  (137-145)  mmol/L


 


Chloride  108 H  ()  mmol/L


 


Carbon Dioxide  17 L  (22-30)  mmol/L


 


BUN  23 H  (9-20)  mg/dL


 


Creatinine  1.71 H  (0.66-1.25)  mg/dL


 


Calcium  7.2 L  (8.4-10.2)  mg/dL


 


Total Bilirubin  11.0 H  (0.2-1.3)  mg/dL


 


AST  111 H  (17-59)  U/L


 


Alkaline Phosphatase  331 H  ()  U/L


 


Albumin  2.7 L  (3.5-5.0)  g/dL


 


Urine Protein   (Negative)  


 


Urine Blood   (Negative)  


 


Urine Bilirubin   (Negative)  


 


Urine RBC   (0-5)  /hpf


 


Hyaline Casts   (0-2)  /lpf


 


Urine Mucus   (None)  /hpf








 Microbiology - Last 24 Hours (Table)











 19 14:05 Urine Culture - Preliminary





 Urine,Suprapubic 











Chest x-ray: report reviewed


US - abdomen: report reviewed





Assessment and Plan


(1) Liver failure


Narrative/Plan: 


   The patient is presenting with marked increase in bilirubin, into the 11 

range, from normal just 2-3 months ago.  The patient does have known prior 

liver metastases, but this clinical presentation is highly sensitive of 

progressive malignancy in the liver.  Ideally, a computed tomography scan or 

MRI with contrast should be performed.  However the patient's elevated 

creatinine precludes the use of contrast without which an accurate picture 

cannot be obtained.  An ultrasound of the liver was ordered to rule out any 

obstructive symptoms, which could potentially be reversible.  This showed 

widespread liver metastasis, with no evidence of intrahepatic biliary dilation.

  CBD was not well visualized.  However, so far, parenchymal progression of his 

liver disease appears to be more likely.  To further confirm, I will order 

bilirubin fractionation.





Current Visit: Yes   Status: Acute   Code(s): K72.90 - HEPATIC FAILURE, 

UNSPECIFIED WITHOUT COMA   SNOMED Code(s): 57074461


   





(2) Congestive heart failure


Narrative/Plan: 


  On surgical evaluation, I feel that congestive heart failure is less likely.  

The patient has no orthopnea, and no evidence of congestion on chest x-ray.  He 

has small bilateral pleural effusion and no crackles on exam or JVD.  He was 

laying quite comfortably in bed, almost flat.  He does have lower extremity 

edema, but that as well as his small pleural effusions are more likely due to 

third spacing from liver decompensation.  I suspect that the patient is 

actually intravascularly depleted due to third spacing.  Echocardiogram and  was normal.  I will therefore start the patient on gentle hydration. 


  


Current Visit: Yes   Status: Acute   Code(s): I50.9 - HEART FAILURE, 

UNSPECIFIED   SNOMED Code(s): 31517573


   





(3) Prostate cancer


Narrative/Plan: 


The patient has metastatic disease, with therapeutic and diagnostic 

circumstances as noted in the HPI.  Resuming that the current clinical 

impression of progressive disease in the liver is correct, the patient is 

obviously failing his current therapy.  He has progressed through multiple 

lines of therapy in the past.  Therefore at this time options for further 

treatment would be quite limited.  This was discussed with the patient.  He was 

agreeable to DO NOT RESUSCITATE, for which order will be placed.  I will 

discuss the case with Dr. Monson.  If we do not have any appropriate additional 

systemic therapy to offer, then comfort care will be discussed


Current Visit: No   Status: Acute   Code(s): C61 - MALIGNANT NEOPLASM OF 

PROSTATE   SNOMED Code(s): 669871394


   





(4) Acute kidney injury


Narrative/Plan: 


At this time this is felt to be more likely prerenal, due to third spacing from 

liver decompensation.  Gentle hydration will be initiated


Current Visit: Yes   Status: Acute   Code(s): N17.9 - ACUTE KIDNEY FAILURE, 

UNSPECIFIED   SNOMED Code(s): 59621808

## 2019-02-02 NOTE — P.PN
Subjective


Progress Note Date: 02/02/19





The patient states that he feels slightly better.  He denies any diarrhea, or 

obvious bleeding.  He continues to have significant jaundice.  Mild nausea, and 

decreased appetite persist.  No orthopnea noted.





Objective





- Vital Signs


Vital signs: 


 Vital Signs











Temp  97.7 F   02/02/19 12:53


 


Pulse  100   02/02/19 12:53


 


Resp  18   02/02/19 12:53


 


BP  107/54   02/02/19 12:53


 


Pulse Ox  98   02/02/19 12:53








 Intake & Output











 02/01/19 02/02/19 02/02/19





 18:59 06:59 18:59


 


Intake Total   240


 


Balance   240


 


Weight 77.337 kg  


 


Intake:   


 


  Oral   240


 


Other:   


 


  # Voids   1














- Constitutional


General appearance: Present: no acute distress





- EENT


Eyes: Present: scleral icterus


ENT: Present: hearing grossly normal





- Respiratory


Respiratory: bilateral: diminished





- Cardiovascular


Rhythm: regular


Heart sounds: normal: S1, S2





- Gastrointestinal


General gastrointestinal: Present: normal bowel sounds, soft





- Integumentary


Integumentary: Present: jaundiced





- Neurologic


Neurologic: Present: CNII-XII intact





- Musculoskeletal


Musculoskeletal: Present: generalized weakness, strength equal bilaterally





- Psychiatric


Psychiatric: Present: A&O x's 3, appropriate affect





- Labs


CBC & Chem 7: 


 02/01/19 14:35





 02/02/19 07:09


Labs: 


 Abnormal Lab Results - Last 24 Hours (Table)











  02/01/19 02/01/19 02/01/19 Range/Units





  14:05 14:35 15:15 


 


RBC   2.98 L   (4.30-5.90)  m/uL


 


Hgb   9.2 L   (13.0-17.5)  gm/dL


 


Hct   30.2 L   (39.0-53.0)  %


 


MCV   101.3 H   (80.0-100.0)  fL


 


MCHC   30.5 L   (31.0-37.0)  g/dL


 


RDW   18.2 H   (11.5-15.5)  %


 


Lymphocytes #   0.5 L   (1.0-4.8)  k/uL


 


PT    13.2 H  (9.0-12.0)  sec


 


INR    1.3 H  (<1.2)  


 


Sodium     (137-145)  mmol/L


 


Chloride     ()  mmol/L


 


Carbon Dioxide     (22-30)  mmol/L


 


BUN     (9-20)  mg/dL


 


Creatinine     (0.66-1.25)  mg/dL


 


Glucose     (74-99)  mg/dL


 


Calcium     (8.4-10.2)  mg/dL


 


Total Bilirubin     (0.2-1.3)  mg/dL


 


Conjugated Bilirubin     (0.0-0.3)  mg/dL


 


Unconjugated Bilirubin     (0.0-1.1)  mg/dL


 


Delta Bilirubin     (0.0-0.2)  mg/dL


 


AST     (17-59)  U/L


 


Alkaline Phosphatase     ()  U/L


 


Total Protein     (6.3-8.2)  g/dL


 


Albumin     (3.5-5.0)  g/dL


 


Urine Protein  1+ H    (Negative)  


 


Urine Blood  Moderate H    (Negative)  


 


Urine Bilirubin  3+ H    (Negative)  


 


Urine RBC  23 H    (0-5)  /hpf


 


Hyaline Casts  88 H    (0-2)  /lpf


 


Urine Mucus  Rare H    (None)  /hpf














  02/01/19 02/02/19 Range/Units





  15:15 07:09 


 


RBC    (4.30-5.90)  m/uL


 


Hgb    (13.0-17.5)  gm/dL


 


Hct    (39.0-53.0)  %


 


MCV    (80.0-100.0)  fL


 


MCHC    (31.0-37.0)  g/dL


 


RDW    (11.5-15.5)  %


 


Lymphocytes #    (1.0-4.8)  k/uL


 


PT    (9.0-12.0)  sec


 


INR    (<1.2)  


 


Sodium  135 L  136 L  (137-145)  mmol/L


 


Chloride  108 H  108 H  ()  mmol/L


 


Carbon Dioxide  17 L  18 L  (22-30)  mmol/L


 


BUN  23 H  28 H  (9-20)  mg/dL


 


Creatinine  1.71 H  1.91 H  (0.66-1.25)  mg/dL


 


Glucose   102 H  (74-99)  mg/dL


 


Calcium  7.2 L  7.0 L  (8.4-10.2)  mg/dL


 


Total Bilirubin  11.0 H  10.6 H  (0.2-1.3)  mg/dL


 


Conjugated Bilirubin   4.8 H  (0.0-0.3)  mg/dL


 


Unconjugated Bilirubin   1.4 H  (0.0-1.1)  mg/dL


 


Delta Bilirubin   4.4 H  (0.0-0.2)  mg/dL


 


AST  111 H  114 H  (17-59)  U/L


 


Alkaline Phosphatase  331 H  334 H  ()  U/L


 


Total Protein   6.2 L  (6.3-8.2)  g/dL


 


Albumin  2.7 L  2.5 L  (3.5-5.0)  g/dL


 


Urine Protein    (Negative)  


 


Urine Blood    (Negative)  


 


Urine Bilirubin    (Negative)  


 


Urine RBC    (0-5)  /hpf


 


Hyaline Casts    (0-2)  /lpf


 


Urine Mucus    (None)  /hpf








 Microbiology - Last 24 Hours (Table)











 02/01/19 14:05 Urine Culture - Preliminary





 Urine,Suprapubic 














Assessment and Plan


(1) Liver failure


Narrative/Plan: 


  Bilirubin remains elevated in the same range at 10.6.  Bilirubin 

fractionation shows about less than 50% direct bilirubin, with however a 

significant percentage of delta bilirubin.  The patient has been seen by 

gastric.  We'll discuss with them, regarding that impression.  If the patient 

is not felt to have an obstructive pathology, which would then indicate 

hepatocellular progression of his malignancy, then prognosis going forward is 

felt to be quite poor.


Current Visit: Yes   Status: Acute   Code(s): K72.90 - HEPATIC FAILURE, 

UNSPECIFIED WITHOUT COMA   SNOMED Code(s): 61830627


   





(2) Congestive heart failure


Narrative/Plan: 


Case was discussed with cardiology.  Repeat echo is again normal.  The patient 

does not have orthopnea, or rales or JVD.  Therefore his presentation is more 

likely due to third spacing from liver decompensation.  Cardiology will assess 

the patient further, and if in agreement with the above clinical impression, we 

will likely try to increase his fluids.


Current Visit: Yes   Status: Acute   Code(s): I50.9 - HEART FAILURE, 

UNSPECIFIED   SNOMED Code(s): 90740296


   





(3) Prostate cancer


Narrative/Plan: 


 As noted above, we will await input from gastrology.  If necessary, additional 

imaging will be ordered.  If if progressive hepatocellular disease is confirmed

, then we would really not have any good further systemic treatment options.  

This was discussed with the patient and his family.  In that case, comfort care 

will be discussed.  Patient is already on no code.  Case was also discussed 

with Dr. Monson, his primary oncologist


Current Visit: No   Status: Acute   Code(s): C61 - MALIGNANT NEOPLASM OF 

PROSTATE   SNOMED Code(s): 882791141


   





(4) Acute kidney injury


Narrative/Plan: 


Nephrology input appreciated. ?  Increase IV fluids, if okay with cardiology


Current Visit: Yes   Status: Acute   Code(s): N17.9 - ACUTE KIDNEY FAILURE, 

UNSPECIFIED   SNOMED Code(s): 13257299

## 2019-02-02 NOTE — P.CONS
History of Present Illness





- Reason for Consult


Consult date: 19


Elevated bilirubin


Requesting physician: Chandra Martinez





- Chief Complaint


Fatigue, shortness of breath, jaundice





- History of Present Illness





The patient is a 78-year-old male with medical history of metastatic prostate 

cancer status post radical prostatectomy, radiation therapy and multiple lines 

of chemotherapy was found to have metastatic disease in  when he underwent 

colon and bladder resection information of a colostomy and urostomy who 

presented to the hospital with complaints of weakness, fatigue and jaundice.  

The patient has been on multiple chemotherapies in the past most recently 

treated with Cabozantinib, which was stopped approximately one week ago due to 

diarrhea, decreased appetite and indigestion.  He presented to the hospital 

reporting increased fatigue, and shortness of breath worse with exertion which 

had been progressing prior to presentation.  The patient also reports that over 

the past few weeks he has noticed increasing jaundice and darkness of his 

urine.  On presentation to the hospital the patient had a WBC count 4, RBC 9.2, 

platelets 162,000, INR 1.3, total bilirubin 11, alkaline phosphatase 331, AST 

111, ALT 44.  The patient also had an ultrasound which showed multiple liver 

lesions with the largest measuring 7.4 x 5.8 cm as well as gallbladder wall 

thickening.





Review of Systems





REVIEW OF SYSTEMS:


CONSTITUTIONAL: Denies any fevers, chills, weight change or fatigue.


CARDIOVASCULAR: Denies any chest pain, palpitations high or low blood pressures


RESPIRATORY: No cough or hemoptysis, but the patient does report shortness of 

breath worse with exertion.  


GENITOURINARY: The patient has a urostomy and reports that his urine has become 

darker over the past few weeks. 


MUSCULOSKELETAL: Weakness reported. 


SKIN: Denies any new rashes or lesions, the patient does report jaundice. 


PSYCHIATRIC: Denies any new onset depression or anxiety. 


NEUROLOGY: Denies headache, denies any new focal deficits. 


EARS/NOSE/THROAT: No recent hearing change, congestion, nasal discharge or sore 

throat.


EYES: No pain in eyes, discharge or change in vision. 


GASTROINTESTINAL: As per HPI.





Past Medical History


Past Medical History: Atrial Fibrillation, Cancer, Hypertension, Prostate 

Disorder, Skin Disorder


Additional Past Medical History / Comment(s): metastatic prostate cancer -found 

also in bladder and rectal CA, completed chemo/radiation, arthritis, rt ear 

tinnitus. rx rt leg 37 years ago and 2016 rt femur fx(sx)


History of Any Multi-Drug Resistant Organisms: None Reported


Past Surgical History: Bladder Surgery, Hernia Repair, Prostate Surgery


Additional Past Surgical History / Comment(s): prostate removed , bladder and 

rectum sx pt now has ileostomy & urostomy; hernia repair; rotator cuff;medi 

port rt chest.  FX Leg 35 yrs ago, 2016 orif rt femur/plate d/t pathelogical fx


Past Anesthesia/Blood Transfusion Reactions: No Reported Reaction


Additional Past Anesthesia/Blood Transfusion Reaction / Comm: blood transfusion 

no reaction


Smoking Status: Former smoker





- Past Family History


  ** Brother(s)


Family Medical History: Cancer





  ** Mother


Additional Family Medical History / Comment(s):  from complications from a 

blood transfusion





  ** Father


Additional Family Medical History / Comment(s):  in motorcycle accident





Medications and Allergies


 Home Medications











 Medication  Instructions  Recorded  Confirmed  Type


 


amLODIPine BES/OLMESARTAN MED 1 tab PO DAILY 07/23/15 02/01/19 History





[Fabricio 5-40 mg Tablet]    


 


Cabozantinib S-Malate [Cabometyx] 20 mg PO DAILY 19 History


 


Cholecalciferol [Vitamin D3] 1,000 unit PO DAILY 19 History


 


Pantoprazole Sodium [Protonix] 40 mg PO DAILY 19 History


 


Sulfamethox-Tmp 800-160Mg [Bactrim 1 tab PO Q12HR 19 History





-160 mg]    











 Allergies











Allergy/AdvReac Type Severity Reaction Status Date / Time


 


hydromorphone HCl Allergy  Confusion Verified 19 15:12





[From Dilaudid]     


 


Mushroom Allergy  Swelling Verified 19 15:12














Physical Exam


Vitals: 


 Vital Signs











  Temp Pulse Pulse Resp BP BP Pulse Ox


 


 19 16:45  98.2 F   81  18   112/55  96


 


 19 12:53  97.7 F   100  18   107/54  98


 


 19 09:10    103 H    


 


 19 09:08  98.2 F   103 H  18   89/63  97


 


 19 04:00  98.4 F   88  18   91/55  96


 


 19 00:00  98.1 F   104 H  18   92/54  95


 


 19 20:20  97.6 F   88  18   106/65  95


 


 19 20:00   108 H   21  106/64   97


 


 19 19:00   108 H   15  103/69  








 Intake and Output











 19





 06:59 14:59 22:59


 


Intake Total  240 0


 


Balance  240 0


 


Intake:   


 


  Oral  240 0


 


Other:   


 


  # Voids  1 3


 


  Weight  77.337 kg 














On physical examination, patient appears comfortable in no apparent distress. 


HEAD: Normocephalic, atraumatic. 


EYES: Scleral icterus noted. No conjunctival injection. 


MOUTH: No lesions, tongue midline. 


NECK: Trachea midline, no gross abnormalities. 


CHEST: Clear to auscultation with no wheezing or rhonchi appreciated. 


HEART: Regular rate and rhythm. 


ABDOMEN: Soft, colostomy noted with nonbloody output.  Urostomy with catheter. 

Bowel sounds are positive. No organomegaly.  No guarding or rigidity.


EXTREMITIES: No pedal edema. 


SKIN: No rashes, jaundice. 


NEUROLOGIC: Alert and oriented x3.  No focal deficits. 





Results


CBC & Chem 7: 


 19 14:35





 19 07:09


Labs: 


 Abnormal Lab Results - Last 24 Hours (Table)











  19 Range/Units





  07:09 


 


Sodium  136 L  (137-145)  mmol/L


 


Chloride  108 H  ()  mmol/L


 


Carbon Dioxide  18 L  (22-30)  mmol/L


 


BUN  28 H  (9-20)  mg/dL


 


Creatinine  1.91 H  (0.66-1.25)  mg/dL


 


Glucose  102 H  (74-99)  mg/dL


 


Calcium  7.0 L  (8.4-10.2)  mg/dL


 


Total Bilirubin  10.6 H  (0.2-1.3)  mg/dL


 


Conjugated Bilirubin  4.8 H  (0.0-0.3)  mg/dL


 


Unconjugated Bilirubin  1.4 H  (0.0-1.1)  mg/dL


 


Delta Bilirubin  4.4 H  (0.0-0.2)  mg/dL


 


AST  114 H  (17-59)  U/L


 


Alkaline Phosphatase  334 H  ()  U/L


 


Total Protein  6.2 L  (6.3-8.2)  g/dL


 


Albumin  2.5 L  (3.5-5.0)  g/dL








 Microbiology - Last 24 Hours (Table)











 19 14:05 Urine Culture - Preliminary





 Urine,Suprapubic 











US - abdomen: report reviewed (Ultrasound of the abdomen with multiple liver 

lesions noted the largest measuring 7.4 x 5.8 cm, with gallbladder wall 

thickening also seen)





Assessment and Plan


(1) Elevated liver enzymes


Narrative/Plan: 


Pleasant 78-year-old male with a known history of metastatic prostate cancer 

status post multiple surgeries, and radiation therapy and multiple lines of 

chemotherapy who presented with increasing weakness, fatigue and jaundice.  The 

patient was found to have an elevation in his liver enzymes with predominantly 

a cholestatic pattern and a total bilirubin of 11 on presentation.  Ultrasound 

of the abdomen did show multiple hepatic lesions without definite bile duct 

dilation.  It is unclear if the patient's elevation in bilirubin is secondary 

to diffuse intrahepatic metastases which would not be amenable to biliary 

stenting, or a large focal lesion causing bile duct obstruction which could be 

treated therapeutically with a stent.  Further imaging is pending to help 

clarify this issue.


Current Visit: Yes   Status: Acute   Code(s): R74.8 - ABNORMAL LEVELS OF OTHER 

SERUM ENZYMES   SNOMED Code(s): 543565519


   


Plan: 





Supportive care


Okay for diet


Continue to monitor liver enzymes and INR


Ultrasound of the abdomen reviewed


Case discussed with the oncology service, appreciated the recommendations


Given patient's elevation from baseline of creatinine use of contrast in 

imaging studies is limited, however at this time a MRCP will be ordered for 

better evaluation of the biliary tree


Thank you for allowing us to participate in the care of the patient, we will 

continue to follow

## 2019-02-02 NOTE — P.NPCON
History of Present Illness





- Reason for Consult


Consult date: 19


acute renal failure





- Chief Complaint


Fatigue





- History of Present Illness


Coming to the hospital with fatigue and diarrhea.  He has history of metastatic 

prostate cancer currently on chemotherapy.  Nephrology was consulted for acute 

kidney injury.  Baseline creatinine is 0.9.  On admission creatinine was 1.7 

and 1.9 today.  He was hypotensive with a systolic blood pressure of 70-80.  

And continues to take his losartan 150 and amlodipine.  While in the hospital 

he was continued on his medications.  No recent contrast studies.  No NSAID 

use.  He was also getting Bactrim as well.








Review of Systems


Constitutional: Reports as per HPI





Past Medical History


Past Medical History: Atrial Fibrillation, Cancer, Hypertension, Prostate 

Disorder, Skin Disorder


Additional Past Medical History / Comment(s): metastatic prostate cancer -found 

also in bladder and rectal CA, completed chemo/radiation, arthritis, rt ear 

tinnitus. rx rt leg 37 years ago and 2016 rt femur fx(sx)


History of Any Multi-Drug Resistant Organisms: None Reported


Past Surgical History: Bladder Surgery, Hernia Repair, Prostate Surgery


Additional Past Surgical History / Comment(s): prostate removed , bladder and 

rectum sx pt now has ileostomy & urostomy; hernia repair; rotator cuff;medi 

port rt chest.  FX Leg 35 yrs ago, 2016 orif rt femur/plate d/t pathelogical fx


Past Anesthesia/Blood Transfusion Reactions: No Reported Reaction


Additional Past Anesthesia/Blood Transfusion Reaction / Comment(s): blood 

transfusion no reaction


Smoking Status: Former smoker





- Past Family History


  ** Brother(s)


Family Medical History: Cancer





  ** Mother


Additional Family Medical History / Comment(s):  from complications from a 

blood transfusion





  ** Father


Additional Family Medical History / Comment(s):  in motorcycle accident





Medications and Allergies


 Home Medications











 Medication  Instructions  Recorded  Confirmed  Type


 


amLODIPine BES/OLMESARTAN MED 1 tab PO DAILY 07/23/15 02/01/19 History





[Fabricio 5-40 mg Tablet]    


 


Cabozantinib S-Malate [Cabometyx] 20 mg PO DAILY 19 History


 


Cholecalciferol [Vitamin D3] 1,000 unit PO DAILY 19 History


 


Pantoprazole Sodium [Protonix] 40 mg PO DAILY 19 History


 


Sulfamethox-Tmp 800-160Mg [Bactrim 1 tab PO Q12HR 19 History





-160 mg]    











 Allergies











Allergy/AdvReac Type Severity Reaction Status Date / Time


 


hydromorphone HCl Allergy  Confusion Verified 19 15:12





[From Dilaudid]     


 


Mushroom Allergy  Swelling Verified 19 15:12














Physical Exam


Vitals: 


 Vital Signs











  Temp Pulse Pulse Resp BP BP Pulse Ox


 


 19 09:10    103 H    


 


 19 09:08  98.2 F   103 H  18   89/63  97


 


 19 04:00  98.4 F   88  18   91/55  96


 


 19 00:00  98.1 F   104 H  18   92/54  95


 


 19 20:20  97.6 F   88  18   106/65  95


 


 19 20:00   108 H   21  106/64   97


 


 19 19:00   108 H   15  103/69  


 


 19 18:38   110 H   18  103/69   98


 


 19 17:02   79   20  110/62   98


 


 19 15:07  98 F  112 H   20  108/95   96


 


 19 13:59  98.1 F  52 L   20  126/89   99








 Intake and Output











 19





 22:59 06:59 14:59


 


Intake Total   240


 


Balance   240


 


Intake:   


 


  Oral   240


 


Other:   


 


  # Voids   1











Lying in bed no acute distress


S1-S2 heard


Diminished breath sounds


Edema








Results





- Lab Results


 Most recent lab results











Calcium  7.0 mg/dL (8.4-10.2)  L  19  07:09    














 19 14:35





 19 07:09





Assessment and Plan


Assessment: 


#1 nonoliguric acute kidney injury secondary to hemodynamic ATN with low blood 

pressures and also concomitant use of losartan and amlodipine.  He was also on 

Bactrim that can cause interstitial nephritis and elevated creatinine.


#2 metastatic prostate cancer


#3 hypotension


#4 edema


#5 metabolic acidosis





Plan: 


#1 continue with IV fluids, change normal saline to bicarb drip.


#2 stop losartan and amlodipine including Bactrim


#3 if antibiotics are needed consider changing to alternative than Bactrim


#4 repeat labs in the morning


#5 maintain systolic blood pressure more than 120.


#6 avoid nephrotoxic agents and hypotensive episodes.

## 2019-02-03 LAB
ALBUMIN SERPL-MCNC: 2.6 G/DL (ref 3.5–5)
ALP SERPL-CCNC: 354 U/L (ref 38–126)
ALT SERPL-CCNC: 48 U/L (ref 21–72)
ANION GAP SERPL CALC-SCNC: 12 MMOL/L
AST SERPL-CCNC: 135 U/L (ref 17–59)
BUN SERPL-SCNC: 30 MG/DL (ref 9–20)
CALCIUM SPEC-MCNC: 7.1 MG/DL (ref 8.4–10.2)
CHLORIDE SERPL-SCNC: 108 MMOL/L (ref 98–107)
CO2 SERPL-SCNC: 18 MMOL/L (ref 22–30)
GLUCOSE SERPL-MCNC: 110 MG/DL (ref 74–99)
POTASSIUM SERPL-SCNC: 4.1 MMOL/L (ref 3.5–5.1)
PROT SERPL-MCNC: 6.4 G/DL (ref 6.3–8.2)
SODIUM SERPL-SCNC: 138 MMOL/L (ref 137–145)

## 2019-02-03 RX ADMIN — POTASSIUM CHLORIDE SCH MLS/HR: 14.9 INJECTION, SOLUTION INTRAVENOUS at 14:05

## 2019-02-03 RX ADMIN — PANTOPRAZOLE SODIUM SCH MG: 40 TABLET, DELAYED RELEASE ORAL at 06:32

## 2019-02-03 RX ADMIN — ONDANSETRON PRN MG: 2 INJECTION INTRAMUSCULAR; INTRAVENOUS at 09:12

## 2019-02-03 RX ADMIN — Medication SCH UNIT: at 13:02

## 2019-02-03 RX ADMIN — MORPHINE SULFATE PRN MG: 4 INJECTION, SOLUTION INTRAMUSCULAR; INTRAVENOUS at 20:17

## 2019-02-03 RX ADMIN — ONDANSETRON PRN MG: 2 INJECTION INTRAMUSCULAR; INTRAVENOUS at 16:30

## 2019-02-03 NOTE — P.PN
Subjective


Progress Note Date: 02/03/19


Seen and examined for the follow-up of acute kidney injury.  No new labs today 

feels better lying comfortable in the bed








Objective





- Vital Signs


Vital signs: 


 Vital Signs











Temp  97.3 F L  02/03/19 09:51


 


Pulse  78   02/03/19 09:51


 


Resp  18   02/03/19 09:51


 


BP  96/59   02/03/19 09:51


 


Pulse Ox  96   02/03/19 09:51








 Intake & Output











 02/02/19 02/03/19 02/03/19





 18:59 06:59 18:59


 


Intake Total 240  140


 


Output Total  325 


 


Balance 240 -325 140


 


Weight 77.337 kg 77.3 kg 


 


Intake:   


 


  Oral 240  140


 


Output:   


 


  Urine  325 


 


Other:   


 


  # Voids 3  














- Exam


No acute distress


S1-S2 heard


Diminished breath sounds


Abdominal distention


Edema








- Labs


CBC & Chem 7: 


 02/01/19 14:35





 02/02/19 07:09


Labs: 


 Microbiology - Last 24 Hours (Table)











 02/01/19 14:05 Urine Culture - Preliminary





 Urine,Suprapubic    Gram Neg Bacilli














Assessment and Plan


Assessment: 


#1 nonoliguric acute kidney injury secondary to hemodynamic ATN with low blood 

pressures and also concomitant use of losartan and amlodipine.  He was also on 

Bactrim that can cause interstitial nephritis and elevated creatinine.


#2 metastatic prostate cancer


#3 hypotension


#4 edema


#5 metabolic acidosis





Plan: 


#1 continue with IV fluids, bicarb drip.


#2 losartan and amlodipine including Bactrim was stopped yesterday, he did get 

a dose as well


#3 admitted to drinking to maintain hemodynamics systolic more than 120.


# 4 avoid nephrotoxic agents and hypotensive episodes.


#5 labs in the morning

## 2019-02-03 NOTE — P.PN
Subjective


This is a 78-year-old male, patient of Dr. Saavedra.  He has a medical 

history of prostate cancer that was diagnosed in 2009 status post prostatectomy 

with radiation treatments.  Patient was in remission until 2015 when he 

developed hematuria, at that time he was found to have a bladder mass and 

underwent resection with colostomy and urostomy.  He is receiving current 

therapy with Cabozantinib and sees oncology on regular basis.  Patient reports 

about a week ago he discontinued the Cabozantinib due to diarrhea, nausea, and 

vomiting.  He did call his oncologist office who advised him to go to the 

emergency room to be evaluated.  Patient's lab revealed hemoglobin 9.2, BUN 28, 

creatinine 1.91, total bilirubin 10.6, alk phos 334.  Ultrasound showed no 

gallstones, ascites, extensive heterogenicity in the liver, consistent with 

tumor, and mild gallbladder wall thickening.  Echocardiogram revealed atrial 

fibrillation, borderline left ventricular hypertrophy, ejection fraction 55-60%

, moderate aortic stenosis, mild aortic regurgitation, no evidence of pulmonary 

hypertension, large pleural effusion.  Chest x-ray showed atelectasis and 

effusions.  Cardiology consulted, as well as nephrology and oncology. 








2/3: Patient evaluated today, noted to be up in bedside chair.  He denies any 

nausea, vomiting, diarrhea, chest pain or dizziness.  MRCP has been ordered 

without contrast, will most likely be completed tomorrow.  Cardiology, 

nephrology and oncology consult appreciated.  He continues with IV fluids, 

losartan, amlodipine and Bactrim were discontinued due to acute kidney injury 

as per nephrology's recommendations.  Bactrim was changed to ceftriaxone.  

Conjugated bilirubin 4.8, unconjugated bilirubin 1.4, total bilirubin 10.6, AST 

114, ALT 47, alk phos 334.  Vital signs are stable, blood pressure 96/59, heart 

rate 78, he remains afebrile.





Objective





- Vital Signs


Vital signs: 


 Vital Signs











Temp  96.8 F L  02/03/19 04:00


 


Pulse  78   02/03/19 04:00


 


Resp  18   02/03/19 04:00


 


BP  100/55   02/03/19 04:00


 


Pulse Ox  96   02/03/19 04:00








 Intake & Output











 02/02/19 02/03/19 02/03/19





 18:59 06:59 18:59


 


Intake Total 240  


 


Output Total  325 


 


Balance 240 -325 


 


Weight 77.337 kg 77.3 kg 


 


Intake:   


 


  Oral 240  


 


Output:   


 


  Urine  325 


 


Other:   


 


  # Voids 3  














- Exam


- Constitutional


General appearance: cooperative, no acute distress





- EENT


Eyes: EOMI, PERRLA, scleral icterus


ENT: hearing grossly normal, normal oropharynx





- Neck


Neck: no lymphadenopathy, normal ROM, no stridor, no thyromegaly


Thyroid: bilateral: normal size, negative: firm, nodule





- Respiratory


Respiratory: bilateral: CTA, negative: dullness, rales, rhonchi, wheezing





- Cardiovascular





 Bilateral lower extremity edema


Rhythm: irregularly irregular


Heart sounds: normal: S1, S2





- Gastrointestinal





Colostomy and urostomy


General gastrointestinal: no distended, normal bowel sounds, soft, no tenderness





- Integumentary


Integumentary: jaundiced





- Neurologic


Neurologic: CNII-XII intact





- Musculoskeletal


Musculoskeletal: generalized weakness, strength equal bilaterally, no right 

sided weakness, no left sided weakness





- Psychiatric


Psychiatric: A&O x's 3








- Labs


CBC & Chem 7: 


 02/01/19 14:35





 02/02/19 07:09


Labs: 


 Abnormal Lab Results - Last 24 Hours (Table)











  02/02/19 Range/Units





  07:09 


 


Sodium  136 L  (137-145)  mmol/L


 


Chloride  108 H  ()  mmol/L


 


Carbon Dioxide  18 L  (22-30)  mmol/L


 


BUN  28 H  (9-20)  mg/dL


 


Creatinine  1.91 H  (0.66-1.25)  mg/dL


 


Glucose  102 H  (74-99)  mg/dL


 


Calcium  7.0 L  (8.4-10.2)  mg/dL


 


Total Bilirubin  10.6 H  (0.2-1.3)  mg/dL


 


Conjugated Bilirubin  4.8 H  (0.0-0.3)  mg/dL


 


Unconjugated Bilirubin  1.4 H  (0.0-1.1)  mg/dL


 


Delta Bilirubin  4.4 H  (0.0-0.2)  mg/dL


 


AST  114 H  (17-59)  U/L


 


Alkaline Phosphatase  334 H  ()  U/L


 


Total Protein  6.2 L  (6.3-8.2)  g/dL


 


Albumin  2.5 L  (3.5-5.0)  g/dL








 Microbiology - Last 24 Hours (Table)











 02/01/19 14:05 Urine Culture - Preliminary





 Urine,Suprapubic    Gram Neg Bacilli














Assessment and Plan


Plan: 


1: Prostate cancer with metastasis disease with liver metastasis and hepatic 

failure: Ultrasound showed widespread liver metastasis, oncology on consult, 

scheduled for MRCP





2.  Congestive heart failure: Cardiology on consult, echocardiogram resulted, 

symptoms are mostly related to third spacing due to the liver decompensation, 

cardiology consult appreciated.  Echocardiogram completed





3.  Acute kidney injury secondary to dehydration from poor oral intake. 

nephrology on consult, Bactrim, losartan and amlodipine held





4.  Hypertension: Daily medications Norvasc 5 mg daily and Cozaar 150 mg held 

due to hypotension and acute kidney injury.





5.  Urinary tract infection: Bactrim as outpatient discontinued, urine cultures 

are pending, started on ceftriaxone





6.  Paroxysmal atrial fibrillation: Currently rate controlled, not a candidate 

for anticoagulation





7.  Hypotension.  Blood pressure medications on hold





6.  GI, DVT prophylaxis: Continue Protonix, SEDs 








The above impression and plan of care have been discussed and directed by 

signing physician. Teena Thomas nurse practitioner acting as scribe for 

signing physician.

## 2019-02-03 NOTE — P.PN
Subjective


Progress Note Date: 02/03/19


Principal diagnosis: 


Liver failure, heart failure





This is a pleasant 78-year-old gentleman with known history of prostate cancer 

treated with radical prostatectomy and, apparently found to have bladder mass 

and underwent radical surgery with resection including a colostomy and 

ureterostomy, pulse in 2015.  He does have recurrent malignancy involving the 

liver.  He has a known history also of maximal atrial fibrillation and 

hypertension.  Patient presents hospital this admission with symptoms of 

increased diarrhea with associated nausea and decreased. 


We were asked to see the patient in consultation for congestive heart failure.  

He had apparently been noticing more shortness of breath than usual as well as 

edema over the last few weeks.  Chest x-ray on admission showed probable 

basilar atelectasis and effusions.  Ultrasound of the gallbladder revealed no 

gallstones, positive ascites, extensive heterotopic genius and the liver 

consistent with a tumor.  EKG showed atrial fibrillation with controlled 

ventricular response.  He is not a candidate for anticoagulation.  A dialysis 

morning showed worsening renal function with a BUN of 28 and creatinine of 

1.91.  On examination, patient is resting comfortably in bed.  He feels his 

breathing is better continues to have decreased appetite and lower extremity 

edema.








Objective





- Vital Signs


Vital signs: 


 Vital Signs











Temp  97.5 F L  02/03/19 13:04


 


Pulse  79   02/03/19 13:04


 


Resp  18   02/03/19 13:04


 


BP  129/64   02/03/19 13:04


 


Pulse Ox  95   02/03/19 13:04








 Intake & Output











 02/02/19 02/03/19 02/03/19





 18:59 06:59 18:59


 


Intake Total 240  140


 


Output Total  325 


 


Balance 240 -325 140


 


Weight 77.337 kg 77.3 kg 


 


Intake:   


 


  Oral 240  140


 


Output:   


 


  Urine  325 


 


Other:   


 


  # Voids 3  














- Exam


GENERAL: 78-year-old  gentleman, appears quite weak and frail, 

jaundiced, in no acute distress at the time of my examination





HEENT: Head is atraumatic, normocephalic.  Pupils equal, round.  Sclera 

anicteric. Conjunctiva jaundiced .  Mucous membranes of the mouth are moist.  

Neck is supple.  There is no elevated jugular venous pressure.  No carotid  

bruit is heard.





HEART EXAMINATION: Heart S1 and S2 irregularly irregular  with a systolic murmur





CHEST EXAMINATION: Lungs reveal diminished air entry bilaterally





ABDOMEN:  Soft, nontender. Bowel sounds are heard. No organomegaly noted.


 


EXTREMITIES: 2+ peripheral pulses with evidence of 2+ peripheral edema and no 

calf tenderness noted.





NEUROLOGIC patient is awake, alert and oriented 3 .








- Labs


CBC & Chem 7: 


 02/01/19 14:35





 02/03/19 12:26


Labs: 


 Abnormal Lab Results - Last 24 Hours (Table)











  02/03/19 Range/Units





  12:26 


 


Chloride  108 H  ()  mmol/L


 


Carbon Dioxide  18 L  (22-30)  mmol/L


 


BUN  30 H  (9-20)  mg/dL


 


Creatinine  1.86 H  (0.66-1.25)  mg/dL


 


Glucose  110 H  (74-99)  mg/dL


 


Calcium  7.1 L  (8.4-10.2)  mg/dL


 


Total Bilirubin  11.7 H  (0.2-1.3)  mg/dL


 


AST  135 H  (17-59)  U/L


 


Alkaline Phosphatase  354 H  ()  U/L


 


Albumin  2.6 L  (3.5-5.0)  g/dL








 Microbiology - Last 24 Hours (Table)











 02/01/19 14:05 Urine Culture - Preliminary





 Urine,Suprapubic    Gram Neg Bacilli














Assessment and Plan


Assessment: 


#1 symptoms of persistent diarrhea, abdominal discomfort and vomiting.  Marked 

increase in bilirubin as compared with a couple of months ago in a patient with 

known liver metastases


#2 paroxysmal atrial fibrillation, not a candidate for anticoagulation


#3 history of prostate cancer, bladder CA, rectal CA


#4 hypertension


#5 congestive heart failure diastolic acute on chronic








Plan: 


From cardiology perspective, medications reviewed and will continue the same.  

Continue to monitor daily weights, intake and output as well as renal function 

closely.  He is not a candidate for anticoagulation.  Further recommendations 

to follow.





NP note has been reviewed, I agree with a documented findings and plan of care.

  Patient was seen and examined.

## 2019-02-03 NOTE — P.PN
Subjective


Progress Note Date: 02/03/19


Principal diagnosis: 





Jaundice, elevated liver enzymes





Patient seen lying in bed with son at his bedside.  No acute events overnight.  

No complaints.





Objective





- Vital Signs


Vital signs: 


 Vital Signs











Temp  98.1 F   02/03/19 16:34


 


Pulse  84   02/03/19 16:34


 


Resp  18   02/03/19 16:34


 


BP  135/72   02/03/19 16:34


 


Pulse Ox  94 L  02/03/19 16:34








 Intake & Output











 02/02/19 02/03/19 02/03/19





 18:59 06:59 18:59


 


Intake Total 240  140


 


Output Total  325 


 


Balance 240 -325 140


 


Weight 77.337 kg 77.3 kg 


 


Intake:   


 


  Oral 240  140


 


Output:   


 


  Urine  325 


 


Other:   


 


  # Voids 3  














- Exam





On physical examination, patient appears comfortable in no apparent distress. 


HEAD: Normocephalic, atraumatic. 


EYES: Scleral icterus noted. No conjunctival injection. 


MOUTH: No lesions, tongue midline. 


NECK: Trachea midline, no gross abnormalities. 


CHEST: Clear to auscultation with no wheezing or rhonchi appreciated. 


HEART: Regular rate and rhythm. 


ABDOMEN: Soft, colostomy noted with nonbloody output.  Urostomy with catheter. 

Bowel sounds are positive. No organomegaly.  No guarding or rigidity.


EXTREMITIES: No pedal edema. 


SKIN: No rashes, jaundice. 


NEUROLOGIC: Alert and oriented x3.  No focal deficits. 





- Labs


CBC & Chem 7: 


 02/01/19 14:35





 02/03/19 12:26


Labs: 


 Abnormal Lab Results - Last 24 Hours (Table)











  02/03/19 Range/Units





  12:26 


 


Chloride  108 H  ()  mmol/L


 


Carbon Dioxide  18 L  (22-30)  mmol/L


 


BUN  30 H  (9-20)  mg/dL


 


Creatinine  1.86 H  (0.66-1.25)  mg/dL


 


Glucose  110 H  (74-99)  mg/dL


 


Calcium  7.1 L  (8.4-10.2)  mg/dL


 


Total Bilirubin  11.7 H  (0.2-1.3)  mg/dL


 


AST  135 H  (17-59)  U/L


 


Alkaline Phosphatase  354 H  ()  U/L


 


Albumin  2.6 L  (3.5-5.0)  g/dL








 Microbiology - Last 24 Hours (Table)











 02/01/19 14:05 Urine Culture - Preliminary





 Urine,Suprapubic    Gram Neg Bacilli














Assessment and Plan


(1) Elevated liver enzymes


Narrative/Plan: 


Pleasant 78-year-old male with a known history of metastatic prostate cancer 

status post multiple surgeries, and radiation therapy and multiple lines of 

chemotherapy who presented with increasing weakness, fatigue and jaundice.  The 

patient was found to have an elevation in his liver enzymes with predominantly 

a cholestatic pattern and a total bilirubin of 11 on presentation.  Ultrasound 

of the abdomen did show multiple hepatic lesions without definite bile duct 

dilation.  It is unclear if the patient's elevation in bilirubin is secondary 

to diffuse intrahepatic metastases which would not be amenable to biliary 

stenting, or a large focal lesion causing bile duct obstruction which could be 

treated therapeutically with a stent.  Further imaging is pending to help 

clarify this issue.


Current Visit: Yes   Status: Acute   Code(s): R74.8 - ABNORMAL LEVELS OF OTHER 

SERUM ENZYMES   SNOMED Code(s): 854131558


   


Plan: 





Supportive care


Okay for diet


Continue to monitor liver enzymes and INR


Ultrasound of the abdomen reviewed


Case discussed with the oncology service, appreciated the recommendations


Given patient's elevation from baseline of creatinine use of contrast in 

imaging studies is limited, however at this time a MRCP will be ordered for 

better evaluation of the biliary tree


Thank you for allowing us to participate in the care of the patient, we will 

continue to follow

## 2019-02-04 LAB
ALBUMIN SERPL-MCNC: 2.5 G/DL (ref 3.5–5)
ALP SERPL-CCNC: 360 U/L (ref 38–126)
ALT SERPL-CCNC: 44 U/L (ref 21–72)
ANION GAP SERPL CALC-SCNC: 9 MMOL/L
AST SERPL-CCNC: 138 U/L (ref 17–59)
BASOPHILS # BLD AUTO: 0 K/UL (ref 0–0.2)
BASOPHILS NFR BLD AUTO: 0 %
BUN SERPL-SCNC: 28 MG/DL (ref 9–20)
CALCIUM SPEC-MCNC: 7 MG/DL (ref 8.4–10.2)
CHLORIDE SERPL-SCNC: 106 MMOL/L (ref 98–107)
CO2 SERPL-SCNC: 22 MMOL/L (ref 22–30)
EOSINOPHIL # BLD AUTO: 0 K/UL (ref 0–0.7)
EOSINOPHIL NFR BLD AUTO: 1 %
ERYTHROCYTE [DISTWIDTH] IN BLOOD BY AUTOMATED COUNT: 3.14 M/UL (ref 4.3–5.9)
ERYTHROCYTE [DISTWIDTH] IN BLOOD: 18.7 % (ref 11.5–15.5)
GLUCOSE SERPL-MCNC: 100 MG/DL (ref 74–99)
HCT VFR BLD AUTO: 31.9 % (ref 39–53)
HGB BLD-MCNC: 9.9 GM/DL (ref 13–17.5)
LYMPHOCYTES # SPEC AUTO: 0.5 K/UL (ref 1–4.8)
LYMPHOCYTES NFR SPEC AUTO: 15 %
MCH RBC QN AUTO: 31.7 PG (ref 25–35)
MCHC RBC AUTO-ENTMCNC: 31.2 G/DL (ref 31–37)
MCV RBC AUTO: 101.9 FL (ref 80–100)
MONOCYTES # BLD AUTO: 0.2 K/UL (ref 0–1)
MONOCYTES NFR BLD AUTO: 6 %
NEUTROPHILS # BLD AUTO: 2.6 K/UL (ref 1.3–7.7)
NEUTROPHILS NFR BLD AUTO: 77 %
PLATELET # BLD AUTO: 167 K/UL (ref 150–450)
POTASSIUM SERPL-SCNC: 4.2 MMOL/L (ref 3.5–5.1)
PROT SERPL-MCNC: 6.2 G/DL (ref 6.3–8.2)
SODIUM SERPL-SCNC: 137 MMOL/L (ref 137–145)
WBC # BLD AUTO: 3.4 K/UL (ref 3.8–10.6)

## 2019-02-04 RX ADMIN — METOCLOPRAMIDE PRN MG: 5 INJECTION, SOLUTION INTRAMUSCULAR; INTRAVENOUS at 11:20

## 2019-02-04 RX ADMIN — MORPHINE SULFATE PRN MG: 4 INJECTION, SOLUTION INTRAMUSCULAR; INTRAVENOUS at 11:50

## 2019-02-04 RX ADMIN — PANTOPRAZOLE SODIUM SCH MG: 40 TABLET, DELAYED RELEASE ORAL at 06:41

## 2019-02-04 RX ADMIN — MORPHINE SULFATE PRN MG: 4 INJECTION, SOLUTION INTRAMUSCULAR; INTRAVENOUS at 20:24

## 2019-02-04 RX ADMIN — POTASSIUM CHLORIDE SCH MLS/HR: 14.9 INJECTION, SOLUTION INTRAVENOUS at 12:49

## 2019-02-04 RX ADMIN — Medication SCH UNIT: at 12:49

## 2019-02-04 NOTE — P.PN
Subjective


Progress Note Date: 02/04/19


Principal diagnosis: 





Jaundice, elevated liver enzymes





Patient seen lying in bed with son and wife at his bedside.  No acute events 

overnight.  No acute complaints.





Objective





- Vital Signs


Vital signs: 


 Vital Signs











Temp  98.2 F   02/04/19 16:00


 


Pulse  106 H  02/04/19 16:00


 


Resp  16   02/04/19 16:00


 


BP  90/46   02/04/19 16:00


 


Pulse Ox  95   02/04/19 16:00








 Intake & Output











 02/04/19 02/04/19 02/05/19





 06:59 18:59 06:59


 


Intake Total 450 500 


 


Output Total 525  


 


Balance -75 500 


 


Weight 78 kg  


 


Intake:   


 


   10 


 


    0.9 400  


 


    Invasive Line 1  10 


 


  Intake, IV Titration 50 250 





  Amount   


 


    Dextrose 5% in Water 1,  250 





    000 ml @ 50 mls/hr IV .   





    Q23H TRISH with Sodium   





    Bicarb (1 Meq/ml) 150 ml   





    Rx#:[id number]   


 


    cefTRIAXone 1,000 mg In 50  





    Sodium Chloride 0.9% 50   





    ml @ 100 mls/hr IVPB HS   





    TRISH Rx#:[id number]   


 


  Oral  240 


 


Output:   


 


  Urine 525  


 


Other:   


 


  # Bowel Movements  1 














- Exam





On physical examination, patient appears comfortable in no apparent distress. 


HEAD: Normocephalic, atraumatic. 


EYES: Scleral icterus noted. No conjunctival injection. 


MOUTH: No lesions, tongue midline. 


NECK: Trachea midline, no gross abnormalities. 


CHEST: Clear to auscultation with no wheezing or rhonchi appreciated. 


HEART: Regular rate and rhythm. 


ABDOMEN: Soft, colostomy noted with nonbloody output.  Urostomy with catheter. 

Bowel sounds are positive. No organomegaly.  No guarding or rigidity.


EXTREMITIES: No pedal edema. 


SKIN: No rashes, jaundice. 


NEUROLOGIC: Alert and oriented x3.  No focal deficits. 





- Labs


CBC & Chem 7: 


 02/04/19 05:36





 02/04/19 05:36


Labs: 


 Abnormal Lab Results - Last 24 Hours (Table)











  02/04/19 02/04/19 Range/Units





  05:36 05:36 


 


WBC  3.4 L   (3.8-10.6)  k/uL


 


RBC  3.14 L   (4.30-5.90)  m/uL


 


Hgb  9.9 L   (13.0-17.5)  gm/dL


 


Hct  31.9 L   (39.0-53.0)  %


 


MCV  101.9 H   (80.0-100.0)  fL


 


RDW  18.7 H   (11.5-15.5)  %


 


Lymphocytes #  0.5 L   (1.0-4.8)  k/uL


 


BUN   28 H  (9-20)  mg/dL


 


Creatinine   1.86 H  (0.66-1.25)  mg/dL


 


Glucose   100 H  (74-99)  mg/dL


 


Calcium   7.0 L  (8.4-10.2)  mg/dL


 


Total Bilirubin   11.0 H  (0.2-1.3)  mg/dL


 


AST   138 H  (17-59)  U/L


 


Alkaline Phosphatase   360 H  ()  U/L


 


Total Protein   6.2 L  (6.3-8.2)  g/dL


 


Albumin   2.5 L  (3.5-5.0)  g/dL








 Microbiology - Last 24 Hours (Table)











 02/01/19 14:05 Urine Culture - Final





 Urine,Suprapubic    Enterobacter cloacae














Assessment and Plan


(1) Elevated liver enzymes


Narrative/Plan: 


Pleasant 78-year-old male with a known history of metastatic prostate cancer 

status post multiple surgeries, and radiation therapy and multiple lines of 

chemotherapy who presented with increasing weakness, fatigue and jaundice.  The 

patient was found to have an elevation in his liver enzymes with predominantly 

a cholestatic pattern and a total bilirubin of 11 on presentation.  Ultrasound 

of the abdomen did show multiple hepatic lesions without definite bile duct 

dilation.  It is unclear if the patient's elevation in bilirubin is secondary 

to diffuse intrahepatic metastases which would not be amenable to biliary 

stenting, or a large focal lesion causing bile duct obstruction which could be 

treated therapeutically with a stent.  MRCP imaging performed tonight did show 

liver metastases, indication of some biliary dilation, still unclear if 

elevation in bilirubin is secondary to intrahepatic disease or obstruction.


Current Visit: Yes   Status: Acute   Priority: High   Code(s): R74.8 - ABNORMAL 

LEVELS OF OTHER SERUM ENZYMES   SNOMED Code(s): 338298543


   


Plan: 





Supportive care


Okay for diet


Continue to monitor liver enzymes and INR


Ultrasound of the abdomen reviewed


Case discussed with the oncology service, appreciated the recommendations


MRCP performed with metastatic disease in the liver noted.  There is also some 

indication of dilation.  Still unclear if the patient's elevation in liver 

enzymes are secondary to metastatic disease or obstruction from metastatic 

disease.  Results available after the patient was seen today.  We'll discuss 

the case with the oncology service and the patient and decide how to proceed, 

either with intervention or hospice care given the patient's advanced disease 

and failure of multiple lines of chemotherapy.


Thank you for allowing us to participate in the care of the patient, we will 

continue to follow

## 2019-02-04 NOTE — P.PN
Subjective


Progress Note Date: 02/04/19


Principal diagnosis: 





liver failure





F/U today pt c/o weakness, very tired, poor appetite, upper abd discomfort, mild





Objective





- Vital Signs


Vital signs: 


 Vital Signs











Temp  98.2 F   02/04/19 16:00


 


Pulse  106 H  02/04/19 16:00


 


Resp  16   02/04/19 16:00


 


BP  90/46   02/04/19 16:00


 


Pulse Ox  95   02/04/19 16:00








 Intake & Output











 02/03/19 02/04/19 02/04/19





 18:59 06:59 18:59


 


Intake Total 140 450 490


 


Output Total  525 


 


Balance 140 -75 490


 


Weight  78 kg 


 


Intake:   


 


  IV  400 


 


    0.9  400 


 


  Intake, IV Titration  50 250





  Amount   


 


    Dextrose 5% in Water 1,   250





    000 ml @ 50 mls/hr IV .   





    Q23H TRISH with Sodium   





    Bicarb (1 Meq/ml) 150 ml   





    Rx#:[id number]   


 


    cefTRIAXone 1,000 mg In  50 





    Sodium Chloride 0.9% 50   





    ml @ 100 mls/hr IVPB HS   





    TRISH Rx#:[id number]   


 


  Oral 140  240


 


Output:   


 


  Urine  525 


 


Other:   


 


  # Bowel Movements   1














- Constitutional


General appearance: Present: average body habitus, cooperative, no acute 

distress





- EENT


Eyes: Present: anicteric sclerae, EOMI


ENT: Present: hearing grossly normal





- Respiratory


Respiratory: bilateral: CTA, diminished





- Cardiovascular


Heart sounds: normal: S1, S2





- Gastrointestinal


General gastrointestinal: Present: normal bowel sounds, soft





- Integumentary


Integumentary: Present: pale





- Neurologic


Neurologic: Present: CNII-XII intact





- Musculoskeletal


Musculoskeletal: Present: generalized weakness





- Psychiatric


Psychiatric: Present: A&O x's 3, appropriate affect, intact judgment & insight





- Labs


CBC & Chem 7: 


 02/04/19 05:36





 02/04/19 05:36


Labs: 


 Abnormal Lab Results - Last 24 Hours (Table)











  02/04/19 02/04/19 Range/Units





  05:36 05:36 


 


WBC  3.4 L   (3.8-10.6)  k/uL


 


RBC  3.14 L   (4.30-5.90)  m/uL


 


Hgb  9.9 L   (13.0-17.5)  gm/dL


 


Hct  31.9 L   (39.0-53.0)  %


 


MCV  101.9 H   (80.0-100.0)  fL


 


RDW  18.7 H   (11.5-15.5)  %


 


Lymphocytes #  0.5 L   (1.0-4.8)  k/uL


 


BUN   28 H  (9-20)  mg/dL


 


Creatinine   1.86 H  (0.66-1.25)  mg/dL


 


Glucose   100 H  (74-99)  mg/dL


 


Calcium   7.0 L  (8.4-10.2)  mg/dL


 


Total Bilirubin   11.0 H  (0.2-1.3)  mg/dL


 


AST   138 H  (17-59)  U/L


 


Alkaline Phosphatase   360 H  ()  U/L


 


Total Protein   6.2 L  (6.3-8.2)  g/dL


 


Albumin   2.5 L  (3.5-5.0)  g/dL








 Microbiology - Last 24 Hours (Table)











 02/01/19 14:05 Urine Culture - Final





 Urine,Suprapubic    Enterobacter cloacae














Assessment and Plan


(1) Congestive heart failure


Current Visit: Yes   Status: Acute   Priority: High   Code(s): I50.9 - HEART 

FAILURE, UNSPECIFIED   SNOMED Code(s): 20820226


   





(2) Elevated liver enzymes


Current Visit: Yes   Status: Acute   Priority: High   Code(s): R74.8 - ABNORMAL 

LEVELS OF OTHER SERUM ENZYMES   SNOMED Code(s): 909875643


   





(3) Liver failure


Current Visit: Yes   Status: Acute   Priority: High   Code(s): K72.90 - HEPATIC 

FAILURE, UNSPECIFIED WITHOUT COMA   SNOMED Code(s): 68811355


   





(4) Generalized weakness


Current Visit: Yes   Status: Acute   Priority: High   Code(s): R53.1 - WEAKNESS

   SNOMED Code(s): 99987096


   





(5) Prostate cancer


Current Visit: Yes   Status: Acute   Priority: High   Code(s): C61 - MALIGNANT 

NEOPLASM OF PROSTATE   SNOMED Code(s): 663146297


   


Plan: 


Discussed with pt concern that the liver failure is due to disease.  In which 

case it would be very difficult to treat him.  If this were, however, an 

obstruction that could be relieved with a procedure, then we could see how 

things go.  


Pt symptoms-feeling tired, unclear thinking-r/t hepatic encephalopathy


MRCP is pending, hopefully this will clarify obstruction vs disease. 


F/U in AM 





 Attests: I have performed H&P and developed impression and plan of care of 

patient, discussed with dictator.  I agree with dictated note, documented as a 

scribe.

## 2019-02-04 NOTE — MR
MRCP

 

HISTORY: Elevated bilirubin

 

Multiplanar multisequence imaging through the abdomen with three-dimensional reconstructions performe
d through the biliary tree.

 

Correlation to ultrasound gallbladder 2/1/2018, CT chest abdomen pelvis 10/12/2018

 

Exam is limited technically.

 

Multiple T2 intense masses are present within the liver. There is pleural effusion which is developed
 in the interval on the right, probable associated atelectasis.

 

Possible mesenteric caking present within the abdomen. Dilated bile ducts are noted. There is fluid i
n the abdomen.

 

IMPRESSION: Exam is markedly limited by patient body habitus. Metastatic disease. Bilateral pleural e
ffusions. Ascites. There is some indication of biliary dilation.

## 2019-02-04 NOTE — P.PN
Subjective


Progress Note Date: 02/04/19





This  is a 78-year-old  gentleman with known history of prostate 

cancer treated with radical prostatectomy followed by radiation, treatment was 

completed in 2010, apparently in 2015 he was found to have a bladder mass and 

underwent radical surgery with resection including a colostomy and year ostomy 

at [facility].  According to oncology's notes, recurrent malignancy was present 

and patient is going through many lines of different therapies.  Most recently 

he has been on targeted therapy with Cabozantinib.  Patient does have a known 

history of paroxysmal atrial fibrillation as well as hypertension.  Patient 

presents to the hospital on this admission with symptoms of increased diarrhea 

with associated nausea, decreased appetite.  Cardiology consultation was 

requested for congestive cardiac failure.  According to the patient, he has 

noticed himself to be more short of breath than usual and over the past few 

weeks has been retaining a significant amount of bilateral peripheral edema 

chest x-ray on admission here showed probable basilar atelectasis and 

effusions.  Ultrasound of the gallbladder does not reveal any gallstones, 

positive ascites, extensive heterogeneous in the liver consistent with a tumor.

  Mild gallbladder wall thickening.  EKG shows atrial fibrillation with 

controlled ventricular response.  Echocardiogram with Doppler study revealed an 

ejection fraction of 55-60%, moderate aortic valve sclerosis moderate aortic 

stenosis small pericardial effusion with large pleural effusion..  Blood 

pressure 90/50 with a heart rate in the 80s, 96% on room air.  White blood cell 

count 4.0, hemoglobin 9.2, platelet count 162.  Sodium 136, potassium 4.0, BUN 

28 and creatinine 1.9 on admission creatinine was 1.7.  Calcium 7.2 total 

bilirubin 11  ALT 47 alk phos 334, conjugated bilirubin 4.8 on 

conjugated 1.4 delta bilirubin 4.4 total protein 6.2 of human 2.5.  BNP level 

7610.  At the time of my examination this morning, patient feels extremely weak

, mild shortness of breath.  Mild abdominal pain.








02/04/2019


Patient seen and examined this morning, overall he states he's feeling better 

and slept well through the night last night.  He is scheduled to undergo MRCP 

today.  The family apparently did have a discussion with oncology, and they may 

proceed with aggressive treatment instead of hospice.  His blood pressure this 

morning 98/50 with a heart rate of 90, 91% on room air.  White blood cell count 

3.4, hemoglobin 9.9, platelet count 167.  Sodium 137, potassium 4.2, BUN 28 and 

creatinine 1.8.   ALT 44 alk phos 360.








Objective





- Vital Signs


Vital signs: 


 Vital Signs











Temp  98.1 F   02/04/19 12:00


 


Pulse  107 H  02/04/19 12:00


 


Resp  16   02/04/19 12:00


 


BP  98/53   02/04/19 12:00


 


Pulse Ox  91 L  02/04/19 12:00








 Intake & Output











 02/03/19 02/04/19 02/04/19





 18:59 06:59 18:59


 


Intake Total 140 450 250


 


Output Total  525 


 


Balance 140 -75 250


 


Weight  78 kg 


 


Intake:   


 


  IV  400 


 


    0.9  400 


 


  Intake, IV Titration  50 250





  Amount   


 


    Dextrose 5% in Water 1,   250





    000 ml @ 50 mls/hr IV .   





    Q23H TRISH with Sodium   





    Bicarb (1 Meq/ml) 150 ml   





    Rx#:[id number]   


 


    cefTRIAXone 1,000 mg In  50 





    Sodium Chloride 0.9% 50   





    ml @ 100 mls/hr IVPB HS   





    TRISH Rx#:[id number]   


 


  Oral 140  


 


Output:   


 


  Urine  525 


 


Other:   


 


  # Bowel Movements   1














- Exam





GENERAL: 78-year-old  gentleman, appears quite weak and frail, 

jaundiced, in no acute distress at the time of my examination





HEENT: Head is atraumatic, normocephalic.  Pupils equal, round.  Sclera 

anicteric. Conjunctiva jaundiced .  Mucous membranes of the mouth are moist.  

Neck is supple.  There is no elevated jugular venous pressure.  No carotid  

bruit is heard.





HEART EXAMINATION: Heart S1 and S2 irregularly irregular  with a systolic murmur





CHEST EXAMINATION: Lungs reveal diminished air entry bilaterally





ABDOMEN:  Soft, nontender. Bowel sounds are heard. No organomegaly noted.


 


EXTREMITIES: 2+ peripheral pulses with evidence of 2+ peripheral edema and no 

calf tenderness noted.





NEUROLOGIC patient is awake, alert and oriented 3 .








- Labs


CBC & Chem 7: 


 02/04/19 05:36





 02/04/19 05:36


Labs: 


 Abnormal Lab Results - Last 24 Hours (Table)











  02/03/19 02/04/19 02/04/19 Range/Units





  12:26 05:36 05:36 


 


WBC   3.4 L   (3.8-10.6)  k/uL


 


RBC   3.14 L   (4.30-5.90)  m/uL


 


Hgb   9.9 L   (13.0-17.5)  gm/dL


 


Hct   31.9 L   (39.0-53.0)  %


 


MCV   101.9 H   (80.0-100.0)  fL


 


RDW   18.7 H   (11.5-15.5)  %


 


Lymphocytes #   0.5 L   (1.0-4.8)  k/uL


 


Chloride  108 H    ()  mmol/L


 


Carbon Dioxide  18 L    (22-30)  mmol/L


 


BUN  30 H   28 H  (9-20)  mg/dL


 


Creatinine  1.86 H   1.86 H  (0.66-1.25)  mg/dL


 


Glucose  110 H   100 H  (74-99)  mg/dL


 


Calcium  7.1 L   7.0 L  (8.4-10.2)  mg/dL


 


Total Bilirubin  11.7 H   11.0 H  (0.2-1.3)  mg/dL


 


AST  135 H   138 H  (17-59)  U/L


 


Alkaline Phosphatase  354 H   360 H  ()  U/L


 


Total Protein    6.2 L  (6.3-8.2)  g/dL


 


Albumin  2.6 L   2.5 L  (3.5-5.0)  g/dL








 Microbiology - Last 24 Hours (Table)











 02/01/19 14:05 Urine Culture - Final





 Urine,Suprapubic    Enterobacter cloacae














Assessment and Plan


Plan: 


Assessment and plan


#1 symptoms of persistent diarrhea, abdominal discomfort and vomiting.  Marked 

increase in bilirubin as compared with a couple of months ago in a patient with 

known liver metastases


#2 paroxysmal atrial fibrillation, not a candidate for anticoagulation


#3 history of prostate cancer, bladder CA, rectal CA


#4 hypertension


#5 congestive heart failure diastolic acute on chronic








Plan


Cardiology's perspective, we will recommend to continue the patient on his 

current medications.  Patient is not a candidate for anticoagulation.








DNP note has been reviewed, I agree with a documented findings and plan of 

care.  Patient was seen and examined.

## 2019-02-04 NOTE — PN
PROGRESS NOTE



Patient is seen for followup for acute kidney injury secondary to hypoperfusion

hypotension.  The patient had also been on Bactrim.  He does have metastatic prostatic

cancer and is maintained on IV bicarb.  Antihypertensive medications are on hold.



PHYSICAL EXAMINATION:

This morning, blood pressure was 110/63, heart rate 104 per minute.  Patient is

afebrile.  Examination of the heart S1, S2.  Examination of lungs bilateral breath

sounds are heard.  Abdomen is soft, nontender.  Examination lower extremity shows

edema.  CNS exam is grossly intact.



LAB:

Show sodium 137, potassium 4.2, chloride 106, BUN 28, serum creatinine 1.86, hemoglobin

9.9 g/dL.



ASSESSMENT:

1. Acute kidney injury, acute tubular necrosis, currently nonoliguric with stable

    renal function, creatinine staying at about 1.86.  UA showed multiple hyaline

    casts.  We do not have an ultrasound of the kidneys which I will obtain.  An

    abdominal CT done in October of 2018 did not show any abnormalities in the kidney.

2. Metabolic acidosis.  Maintained on IV sodium bicarb.

3. Metastatic prostatic cancer.

4. Bladder cancer status post cystectomy with ileal loop urostomy and colostomy.  I

    believe this was mostly related to his previous prostate cancer.



PLAN:

Continue with the IV bicarb for now.  Repeat labs in a.m.  Obtain ultrasound of the

kidneys.  Continue IV bicarb.  Repeat labs in a.m.





MMODL / IJN: [id number] / Job#: [id number]

## 2019-02-04 NOTE — P.PN
Subjective


Progress Note Date: 02/04/19





This is a 78-year-old male, patient of Dr. Saavedra.  He has a medical 

history of prostate cancer that was diagnosed in 2009 status post prostatectomy 

with radiation treatments.  Patient was in remission until 2015 when he 

developed hematuria, at that time he was found to have a bladder mass and 

underwent resection with colostomy and urostomy.  He is receiving current 

therapy with Cabozantinib and sees oncology on regular basis.  Patient reports 

about a week ago he discontinued the Cabozantinib due to diarrhea, nausea, and 

vomiting.  He did call his oncologist office who advised him to go to the 

emergency room to be evaluated.  Patient's lab revealed hemoglobin 9.2, BUN 28, 

creatinine 1.91, total bilirubin 10.6, alk phos 334.  Ultrasound showed no 

gallstones, ascites, extensive heterogenicity in the liver, consistent with 

tumor, and mild gallbladder wall thickening.  Echocardiogram revealed atrial 

fibrillation, borderline left ventricular hypertrophy, ejection fraction 55-60%

, moderate aortic stenosis, mild aortic regurgitation, no evidence of pulmonary 

hypertension, large pleural effusion.  Chest x-ray showed atelectasis and 

effusions.  Cardiology consulted, as well as nephrology and oncology. 








2/3: Patient evaluated today, noted to be up in bedside chair.  He denies any 

nausea, vomiting, diarrhea, chest pain or dizziness.  MRCP has been ordered 

without contrast, will most likely be completed tomorrow.  Cardiology, 

nephrology and oncology consult appreciated.  He continues with IV fluids, 

losartan, amlodipine and Bactrim were discontinued due to acute kidney injury 

as per nephrology's recommendations.  Bactrim was changed to ceftriaxone.  

Conjugated bilirubin 4.8, unconjugated bilirubin 1.4, total bilirubin 10.6, AST 

114, ALT 47, alk phos 334.  Vital signs are stable, blood pressure 96/59, heart 

rate 78, he remains afebrile.





2/4: Patient states he feels a little bit better from yesterday.  He states he 

slept well.  He did receive morphine last night that helped.  He denies any 

shortness of breath while flat in bed.  He has had very little output from his 

colostomy but is eating very little.  Andrade catheter is a medium color orange.  

He is scheduled for MRCP today at 4 PM, ordered by GI to evaluate biliary tree.

  Initially plan was home with hospice but family states that they have seen 

Dr. Price and there may be plan for aggressive treatment.  Cardiology is 

following.  Patient is not candidate for anticoagulation with paroxysmal atrial 

fibrillation.  He is not currently on Lasix.  Nephrology continues to follow 

the patient.  Patient has been afebrile, blood pressure 110/63, heart rate 

running between 60 and 106, pulse ox 98% on room air.  White count is 3.4, 

hemoglobin 9.9, platelet count 167, , ALT 44, alkaline phosphatase 360.  

BUN 28 and creatinine 1.86.  Urine culture is positive for Enterobacter cloacae 

which is susceptible to ceftriaxone.  Patient will be transferred to the 

[facility] floor without telemetry.  Case management is following for discharge 

planning.





Review Of Systems:


Constitutional: No fever, no chills, no night sweats.  No weight change.  

Reports weakness, reports fatigue or lethargy.  Reports daytime sleepiness.


EENT: No headache.  No blurred vision or double vision, no loss of vision.  No 

loss of Hearing, no ringing in the ears, no dizziness.  No nasal drainage or 

congestion.  No epistaxis.  No sore throat.


Lungs: No shortness of breath, cough, no sputum production.  No wheezing.


Cardiovascular: No chest pain, no lower extremity edema.  No palpitations.  No 

paroxysmal nocturnal dyspnea.  No orthopnea.  No lightheadedness or dizziness.  

No syncopal episodes.


Abdominal: No abdominal pain.  No nausea, vomiting.  No diarrhea.  No 

constipation.  No bloody or tarry stools reports loss of appetite.


Genitourinary: No dysuria, increased frequency, urgency.  No urinary retention.


Musculoskeletal: No myalgias.  No muscle weakness, no gait dysfunction, no 

frequent falls.  No back pain.  No neck pain.


Integumentary: No wounds, no lesions.  No rash or pruritus.  No unusual 

bruising.  No change in hair or nails.


Neurologic: No aphasia. No facial droop. No change in mentation. No head 

injury. No headache. No paralysis. No paresthesia.


Psychiatric: No depression.  No anxiety.  No mood swings.


Endocrine: No abnormal blood sugars.  No weight change.  No excessive sweating 

or thirst.  No cold intolerance. 








Objective





- Vital Signs


Vital signs: 


 Vital Signs











Temp  98.0 F   02/04/19 07:49


 


Pulse  104 H  02/04/19 07:49


 


Resp  16   02/04/19 07:49


 


BP  110/63   02/04/19 07:49


 


Pulse Ox  98   02/04/19 07:49








 Intake & Output











 02/03/19 02/04/19 02/04/19





 18:59 06:59 18:59


 


Intake Total 140 450 


 


Output Total  525 


 


Balance 140 -75 


 


Weight  78 kg 


 


Intake:   


 


  IV  400 


 


    0.9  400 


 


  Intake, IV Titration  50 





  Amount   


 


    cefTRIAXone 1,000 mg In  50 





    Sodium Chloride 0.9% 50   





    ml @ 100 mls/hr IVPB [facility] Rx#:[id number]   


 


  Oral 140  


 


Output:   


 


  Urine  525 














- Exam





General appearance: cooperative, no acute distress, patient resting in bed.  

Wife and daughter are at the bedside.





- EENT


Eyes: EOMI, PERRLA, scleral icterus


ENT: hearing grossly normal, normal oropharynx





- Neck


Neck: no lymphadenopathy, normal ROM, no stridor, no thyromegaly


Thyroid: bilateral: normal size, negative: firm, nodule





- Respiratory


Respiratory: bilateral: CTA, negative: dullness, rales, rhonchi, wheezing





- Cardiovascular





 Bilateral lower extremity edema


Rhythm: irregularly irregular


Heart sounds: normal: S1, S2





- Gastrointestinal





Colostomy and urostomy


General gastrointestinal: no distended, normal bowel sounds, soft, no tenderness





- Integumentary


Integumentary: jaundiced





- Neurologic


Neurologic: CNII-XII intact





- Musculoskeletal


Musculoskeletal: generalized weakness, strength equal bilaterally, no right 

sided weakness, no left sided weakness





- Psychiatric


Psychiatric: A&O x's 3





- Labs


CBC & Chem 7: 


 02/04/19 05:36





 02/04/19 05:36


Labs: 


 Abnormal Lab Results - Last 24 Hours (Table)











  02/03/19 02/04/19 02/04/19 Range/Units





  12:26 05:36 05:36 


 


WBC   3.4 L   (3.8-10.6)  k/uL


 


RBC   3.14 L   (4.30-5.90)  m/uL


 


Hgb   9.9 L   (13.0-17.5)  gm/dL


 


Hct   31.9 L   (39.0-53.0)  %


 


MCV   101.9 H   (80.0-100.0)  fL


 


RDW   18.7 H   (11.5-15.5)  %


 


Lymphocytes #   0.5 L   (1.0-4.8)  k/uL


 


Chloride  108 H    ()  mmol/L


 


Carbon Dioxide  18 L    (22-30)  mmol/L


 


BUN  30 H   28 H  (9-20)  mg/dL


 


Creatinine  1.86 H   1.86 H  (0.66-1.25)  mg/dL


 


Glucose  110 H   100 H  (74-99)  mg/dL


 


Calcium  7.1 L   7.0 L  (8.4-10.2)  mg/dL


 


Total Bilirubin  11.7 H   11.0 H  (0.2-1.3)  mg/dL


 


AST  135 H   138 H  (17-59)  U/L


 


Alkaline Phosphatase  354 H   360 H  ()  U/L


 


Total Protein    6.2 L  (6.3-8.2)  g/dL


 


Albumin  2.6 L   2.5 L  (3.5-5.0)  g/dL








 Microbiology - Last 24 Hours (Table)











 02/01/19 14:05 Urine Culture - Final





 Urine,Suprapubic    Enterobacter cloacae














Assessment and Plan


Plan: 





1. 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 

failure: Ultrasound showed widespread liver metastasis, oncology on consult, 

scheduled for MRCP





2.  Congestive heart failure: Cardiology on consult, echocardiogram resulted, 

symptoms are mostly related to third spacing due to the liver decompensation, 

cardiology consult appreciated.  Echocardiogram completed





3.  Acute kidney injury secondary to dehydration from poor oral intake. 

nephrology on consult, Bactrim, losartan and amlodipine held.  Patient was 

placed on a bicarb drip.





4.  Hypertension: Daily medications Norvasc 5 mg daily and Cozaar 150 mg held 

due to hypotension and acute kidney injury.





5.  Enterobacter urinary tract infection: Bactrim as outpatient discontinued, 

urine cultures are pending, started on ceftriaxone.





6.  Paroxysmal atrial fibrillation: Currently rate controlled, not a candidate 

for anticoagulation





7.  Hypotension.  Blood pressure medications on hold





6.  GI, DVT prophylaxis: Continue Protonix, SEDs 








The above impression and plan of care have been discussed and directed by 

signing physician. Diane Barbour nurse practitioner acting as scribe for signing 

physician.

## 2019-02-05 LAB
ALBUMIN SERPL-MCNC: 2.3 G/DL (ref 3.5–5)
ALP SERPL-CCNC: 347 U/L (ref 38–126)
ALT SERPL-CCNC: 43 U/L (ref 21–72)
ANION GAP SERPL CALC-SCNC: 10 MMOL/L
AST SERPL-CCNC: 119 U/L (ref 17–59)
BASOPHILS # BLD AUTO: 0 K/UL (ref 0–0.2)
BASOPHILS NFR BLD AUTO: 0 %
BUN SERPL-SCNC: 33 MG/DL (ref 9–20)
CALCIUM SPEC-MCNC: 6.5 MG/DL (ref 8.4–10.2)
CHLORIDE SERPL-SCNC: 102 MMOL/L (ref 98–107)
CO2 SERPL-SCNC: 22 MMOL/L (ref 22–30)
EOSINOPHIL # BLD AUTO: 0.1 K/UL (ref 0–0.7)
EOSINOPHIL NFR BLD AUTO: 1 %
ERYTHROCYTE [DISTWIDTH] IN BLOOD BY AUTOMATED COUNT: 2.85 M/UL (ref 4.3–5.9)
ERYTHROCYTE [DISTWIDTH] IN BLOOD: 18.6 % (ref 11.5–15.5)
GLUCOSE SERPL-MCNC: 97 MG/DL (ref 74–99)
HCT VFR BLD AUTO: 29.1 % (ref 39–53)
HGB BLD-MCNC: 9 GM/DL (ref 13–17.5)
LYMPHOCYTES # SPEC AUTO: 0.4 K/UL (ref 1–4.8)
LYMPHOCYTES NFR SPEC AUTO: 11 %
MCH RBC QN AUTO: 31.5 PG (ref 25–35)
MCHC RBC AUTO-ENTMCNC: 30.9 G/DL (ref 31–37)
MCV RBC AUTO: 102 FL (ref 80–100)
MONOCYTES # BLD AUTO: 0.3 K/UL (ref 0–1)
MONOCYTES NFR BLD AUTO: 8 %
NEUTROPHILS # BLD AUTO: 2.8 K/UL (ref 1.3–7.7)
NEUTROPHILS NFR BLD AUTO: 77 %
PLATELET # BLD AUTO: 141 K/UL (ref 150–450)
POTASSIUM SERPL-SCNC: 4.2 MMOL/L (ref 3.5–5.1)
PROT SERPL-MCNC: 5.8 G/DL (ref 6.3–8.2)
SODIUM SERPL-SCNC: 134 MMOL/L (ref 137–145)
WBC # BLD AUTO: 3.7 K/UL (ref 3.8–10.6)

## 2019-02-05 RX ADMIN — MORPHINE SULFATE PRN MG: 4 INJECTION, SOLUTION INTRAMUSCULAR; INTRAVENOUS at 02:44

## 2019-02-05 RX ADMIN — Medication SCH UNIT: at 11:34

## 2019-02-05 RX ADMIN — METOCLOPRAMIDE PRN MG: 5 INJECTION, SOLUTION INTRAMUSCULAR; INTRAVENOUS at 17:55

## 2019-02-05 RX ADMIN — MORPHINE SULFATE PRN MG: 4 INJECTION, SOLUTION INTRAMUSCULAR; INTRAVENOUS at 11:34

## 2019-02-05 RX ADMIN — POTASSIUM CHLORIDE SCH MLS/HR: 14.9 INJECTION, SOLUTION INTRAVENOUS at 20:37

## 2019-02-05 RX ADMIN — PANTOPRAZOLE SODIUM SCH MG: 40 TABLET, DELAYED RELEASE ORAL at 06:37

## 2019-02-05 RX ADMIN — MORPHINE SULFATE PRN MG: 4 INJECTION, SOLUTION INTRAMUSCULAR; INTRAVENOUS at 20:35

## 2019-02-05 RX ADMIN — Medication SCH MG: at 20:35

## 2019-02-05 RX ADMIN — POTASSIUM CHLORIDE SCH MLS/HR: 14.9 INJECTION, SOLUTION INTRAVENOUS at 12:59

## 2019-02-05 NOTE — P.PN
Subjective


Progress Note Date: 02/05/19


Principal diagnosis: 





liver failure





F/U today pt c/o that he would like to go home.  No acute complaints, no 

progressive symptoms to report





Objective





- Vital Signs


Vital signs: 


 Vital Signs











Temp  98.1 F   02/05/19 15:00


 


Pulse  113 H  02/05/19 15:00


 


Resp  16   02/05/19 15:00


 


BP  87/55   02/05/19 15:00


 


Pulse Ox  92 L  02/05/19 15:00








 Intake & Output











 02/04/19 02/05/19 02/05/19





 18:59 06:59 18:59


 


Intake Total 500 810 350


 


Output Total  375 


 


Balance 500 435 350


 


Weight  78.9 kg 78.9 kg


 


Intake:   


 


  IV 10 410 


 


    0.9  400 


 


    Invasive Line 1 10 10 


 


  Intake, IV Titration 250 400 350





  Amount   


 


    Dextrose 5% in Water 1, 250 400 350





    000 ml @ 50 mls/hr IV .   





    Q23H TRISH with Sodium   





    Bicarb (1 Meq/ml) 150 ml   





    Rx#:[id number]   


 


  Oral 240  0


 


Output:   


 


  Urine  375 


 


Other:   


 


  # Voids   0


 


  # Bowel Movements 1  0














- Constitutional


Constitutional Comment(s): 





cachetic


General appearance: Present: cooperative, no acute distress





- EENT


Eyes: Present: EOMI, scleral icterus





- Respiratory


Respiratory: bilateral: CTA (weak inspiratory effort)





- Cardiovascular


Rhythm: regular


Heart sounds: normal: S1, S2





- Peripheral edema


  ** leg


Peripheral Edema: bilateral: None





- Gastrointestinal


General gastrointestinal: Present: normal bowel sounds, soft





- Integumentary


Integumentary: Present: jaundiced





- Neurologic


Neurologic: Present: CNII-XII intact





- Musculoskeletal


Musculoskeletal: Present: generalized weakness





- Psychiatric


Psychiatric: Present: A&O x's 3, appropriate affect





- Labs


CBC & Chem 7: 


 02/05/19 06:00





 02/05/19 06:00


Labs: 


 Abnormal Lab Results - Last 24 Hours (Table)











  02/05/19 02/05/19 Range/Units





  06:00 06:00 


 


WBC  3.7 L   (3.8-10.6)  k/uL


 


RBC  2.85 L   (4.30-5.90)  m/uL


 


Hgb  9.0 L   (13.0-17.5)  gm/dL


 


Hct  29.1 L   (39.0-53.0)  %


 


MCV  102.0 H   (80.0-100.0)  fL


 


MCHC  30.9 L   (31.0-37.0)  g/dL


 


RDW  18.6 H   (11.5-15.5)  %


 


Plt Count  141 L   (150-450)  k/uL


 


Lymphocytes #  0.4 L   (1.0-4.8)  k/uL


 


Sodium   134 L  (137-145)  mmol/L


 


BUN   33 H  (9-20)  mg/dL


 


Creatinine   2.23 H  (0.66-1.25)  mg/dL


 


Calcium   6.5 L  (8.4-10.2)  mg/dL


 


Total Bilirubin   10.2 H  (0.2-1.3)  mg/dL


 


AST   119 H  (17-59)  U/L


 


Alkaline Phosphatase   347 H  ()  U/L


 


Total Protein   5.8 L  (6.3-8.2)  g/dL


 


Albumin   2.3 L  (3.5-5.0)  g/dL














- Imaging and Cardiology





MRCP report reviewed





Assessment and Plan


(1) Congestive heart failure


Current Visit: Yes   Status: Acute   Priority: High   Code(s): I50.9 - HEART 

FAILURE, UNSPECIFIED   SNOMED Code(s): 08128670


   





(2) Elevated liver enzymes


Current Visit: Yes   Status: Acute   Priority: High   Code(s): R74.8 - ABNORMAL 

LEVELS OF OTHER SERUM ENZYMES   SNOMED Code(s): 484458846


   





(3) Liver failure


Current Visit: Yes   Status: Acute   Priority: High   Code(s): K72.90 - HEPATIC 

FAILURE, UNSPECIFIED WITHOUT COMA   SNOMED Code(s): 62532031


   





(4) Generalized weakness


Current Visit: Yes   Status: Acute   Priority: High   Code(s): R53.1 - WEAKNESS

   SNOMED Code(s): 13052703


   





(5) Prostate cancer


Current Visit: Yes   Status: Acute   Priority: High   Code(s): C61 - MALIGNANT 

NEOPLASM OF PROSTATE   SNOMED Code(s): 096049417


   


Plan: 


Dr. Price discussed case with GI who is going to review MRCP report.  Need to 

know if obstruction that can be corrected vs progressive disease in liver, in 

which case it would be very difficult to treat him.  Await GI review and 

recommendations.


F/U in AM 





 Attests: I have performed H&P and developed impression and plan of care of 

patient, discussed with dictator.  I agree with dictated note, documented as a 

scribe.

## 2019-02-05 NOTE — PN
PROGRESS NOTE



Patient is seen for followup for acute kidney injury.  He is currently sitting up in

bed.  The patient denies any significant complaints.  He has been having pain and just

received morphine.  The patient is going down for a biliary stent placement.  He has

been voiding on his own.  The patient is maintained on IV fluids.  Oral intake is poor.



PHYSICAL EXAMINATION:

This morning blood pressure was 84/58, heart rate 61 per minute.  Patient is afebrile.

Examination of the heart S1, S2.  Examination of the lungs, decreased breath sounds at

bases.  Abdomen is soft, nontender.  Examination of the lower extremities shows edema

1+ bilaterally.  Patient is jaundiced.



LABS:

Show sodium 134, potassium 4.2, BUN 33, serum creatinine 2.23, hemoglobin 9.0 g/dL,

bilirubin is 10.2, calcium 6.5, albumin 2.3.



ASSESSMENT:

1. Acute kidney injury, currently nonoliguric.  Need to rule out urinary retention.

    Serum creatinine is slightly higher than yesterday.  The patient is not on any

    nephrotoxic medications.  He is maintained on gentle IV hydration with bicarb drip

    at 50 mL an hour.  Oral intake remains poor.  The significant bilirubinemia can

    also contribute to the acute kidney injury by causing acute tubular necrosis.

2. History of prostatic cancer with bladder mass status post cystectomy, ileal loop

    urostomy and colostomy.

3. Obstructive jaundice, most likely secondary to liver metastasis going for possible

    biliary stent placement.

4. Hypertension, controlled.  The blood pressure is actually low.  All medications are

    on hold including angiotensin receptor blockers.

5. Urinary tract infection with Enterobacter.  Currently off of Bactrim, which patient

    had initially been on.

6. Paroxysmal atrial fibrillation with controlled ventricular response.



PLAN:

Continue to encourage increased oral intake, poor prognosis.  I will discontinue the

bicarb drip and switch to oral sodium bicarb.



MMODL / IJN: [id number] / Job#: [id number]

## 2019-02-05 NOTE — P.PN
Subjective


Progress Note Date: 02/05/19





This is a 78-year-old male, patient of Dr. Saavedra.  He has a medical 

history of prostate cancer that was diagnosed in 2009 status post prostatectomy 

with radiation treatments.  Patient was in remission until 2015 when he 

developed hematuria, at that time he was found to have a bladder mass and 

underwent resection with colostomy and urostomy.  He is receiving current 

therapy with Cabozantinib and sees oncology on regular basis.  Patient reports 

about a week ago he discontinued the Cabozantinib due to diarrhea, nausea, and 

vomiting.  He did call his oncologist office who advised him to go to the 

emergency room to be evaluated.  Patient's lab revealed hemoglobin 9.2, BUN 28, 

creatinine 1.91, total bilirubin 10.6, alk phos 334.  Ultrasound showed no 

gallstones, ascites, extensive heterogenicity in the liver, consistent with 

tumor, and mild gallbladder wall thickening.  Echocardiogram revealed atrial 

fibrillation, borderline left ventricular hypertrophy, ejection fraction 55-60%

, moderate aortic stenosis, mild aortic regurgitation, no evidence of pulmonary 

hypertension, large pleural effusion.  Chest x-ray showed atelectasis and 

effusions.  Cardiology consulted, as well as nephrology and oncology. 








2/3: Patient evaluated today, noted to be up in bedside chair.  He denies any 

nausea, vomiting, diarrhea, chest pain or dizziness.  MRCP has been ordered 

without contrast, will most likely be completed tomorrow.  Cardiology, 

nephrology and oncology consult appreciated.  He continues with IV fluids, 

losartan, amlodipine and Bactrim were discontinued due to acute kidney injury 

as per nephrology's recommendations.  Bactrim was changed to ceftriaxone.  

Conjugated bilirubin 4.8, unconjugated bilirubin 1.4, total bilirubin 10.6, AST 

114, ALT 47, alk phos 334.  Vital signs are stable, blood pressure 96/59, heart 

rate 78, he remains afebrile.





2/4: Patient states he feels a little bit better from yesterday.  He states he 

slept well.  He did receive morphine last night that helped.  He denies any 

shortness of breath while flat in bed.  He has had very little output from his 

colostomy but is eating very little.  Andrade catheter is a medium color orange.  

He is scheduled for MRCP today at 4 PM, ordered by GI to evaluate biliary tree.

  Initially plan was home with hospice but family states that they have seen 

Dr. Price and there may be plan for aggressive treatment.  Cardiology is 

following.  Patient is not candidate for anticoagulation with paroxysmal atrial 

fibrillation.  He is not currently on Lasix.  Nephrology continues to follow 

the patient.  Patient has been afebrile, blood pressure 110/63, heart rate 

running between 60 and 106, pulse ox 98% on room air.  White count is 3.4, 

hemoglobin 9.9, platelet count 167, , ALT 44, alkaline phosphatase 360.  

BUN 28 and creatinine 1.86.  Urine culture is positive for Enterobacter cloacae 

which is susceptible to ceftriaxone.  Patient will be transferred to the 

[facility] floor without telemetry.  Case management is following for discharge 

planning.





2/5: MRCP is limited by patient's body habitus.  Metastatic disease.  Bilateral 

pleural effusions.  Ascites.  There is some indication of biliary dilatation.  

Study is to be reviewed by  and radiology to determine plan.  If ERCP/

stent will not be of benefit, most likely patient will be placed under hospice 

and discharged home.  Renal function is worsening with BUN 33 and creatinine 

2.23.  Patient is followed by Dr. Rodas.  Patient states that he is not eating 

very much and is on a protein supplement.  He has been afebrile, blood pressure 

8458-139/65, pulse ox 92% on room air, heart rate running in the 60s to 80s.  

Hemoglobin is 9, platelet count 141.  Total bilirubin 10.2, , ALT 43, 

alkaline phosphatase 347.








Review Of Systems:


Constitutional: No fever, no chills, no night sweats.  No weight change.  

Reports weakness, reports fatigue reports lethargy.  Reports daytime sleepiness.


EENT: No headache.  No blurred vision or double vision, no loss of vision.  No 

loss of Hearing, no ringing in the ears, no dizziness.  No nasal drainage or 

congestion.  No epistaxis.  No sore throat.


Lungs: No shortness of breath, cough, no sputum production.  No wheezing.


Cardiovascular: No chest pain, no lower extremity edema.  No palpitations.  No 

paroxysmal nocturnal dyspnea.  No orthopnea.  No lightheadedness or dizziness.  

No syncopal episodes.


Abdominal: No abdominal pain.  No nausea, vomiting.  No diarrhea.  No 

constipation.  No bloody or tarry stools reports loss of appetite.


Genitourinary: No dysuria, increased frequency, urgency.  No urinary retention.


Musculoskeletal: No myalgias.  No muscle weakness, no gait dysfunction, no 

frequent falls.  No back pain.  No neck pain.


Integumentary: No wounds, no lesions.  No rash or pruritus.  No unusual 

bruising.  No change in hair or nails.


Neurologic: No aphasia. No facial droop. No change in mentation. No head 

injury. No headache. No paralysis. No paresthesia.


Psychiatric: No depression.  No anxiety.  No mood swings.


Endocrine: No abnormal blood sugars.  No weight change.  No excessive sweating 

or thirst.  No cold intolerance. 








Objective





- Vital Signs


Vital signs: 


 Vital Signs











Temp  98.3 F   02/05/19 07:45


 


Pulse  82   02/05/19 07:45


 


Resp  16   02/05/19 07:45


 


BP  84/58   02/05/19 07:45


 


Pulse Ox  92 L  02/05/19 07:45








 Intake & Output











 02/04/19 02/05/19 02/05/19





 18:59 06:59 18:59


 


Intake Total 500 810 0


 


Output Total  375 


 


Balance 500 435 0


 


Weight  78.9 kg 


 


Intake:   


 


  IV 10 410 


 


    0.9  400 


 


    Invasive Line 1 10 10 


 


  Intake, IV Titration 250 400 





  Amount   


 


    Dextrose 5% in Water 1, 250 400 





    000 ml @ 50 mls/hr IV .   





    Q23H TRISH with Sodium   





    Bicarb (1 Meq/ml) 150 ml   





    Rx#:[id number]   


 


  Oral 240  0


 


Output:   


 


  Urine  375 


 


Other:   


 


  # Bowel Movements 1  














- Exam





General appearance: cooperative, no acute distress, patient resting in bed.  

Son at the bedside.





- EENT


Eyes: EOMI, PERRLA, scleral icterus


ENT: hearing grossly normal, normal oropharynx





- Neck


Neck: no lymphadenopathy, normal ROM, no stridor, no thyromegaly


Thyroid: bilateral: normal size, negative: firm, nodule





- Respiratory


Respiratory: bilateral: CTA, negative: dullness, rales, rhonchi, wheezing





- Cardiovascular





 Bilateral lower extremity edema


Rhythm: irregularly irregular


Heart sounds: normal: S1, S2





- Gastrointestinal





Colostomy and urostomy


General gastrointestinal: no distended, normal bowel sounds, soft, no tenderness





- Integumentary


Integumentary: jaundiced





- Neurologic


Neurologic: CNII-XII intact





- Musculoskeletal


Musculoskeletal: generalized weakness, strength equal bilaterally, no right 

sided weakness, no left sided weakness





- Psychiatric


Psychiatric: A&O x's 3





- Labs


CBC & Chem 7: 


 02/05/19 06:00





 02/05/19 06:00


Labs: 


 Abnormal Lab Results - Last 24 Hours (Table)











  02/05/19 02/05/19 Range/Units





  06:00 06:00 


 


WBC  3.7 L   (3.8-10.6)  k/uL


 


RBC  2.85 L   (4.30-5.90)  m/uL


 


Hgb  9.0 L   (13.0-17.5)  gm/dL


 


Hct  29.1 L   (39.0-53.0)  %


 


MCV  102.0 H   (80.0-100.0)  fL


 


MCHC  30.9 L   (31.0-37.0)  g/dL


 


RDW  18.6 H   (11.5-15.5)  %


 


Plt Count  141 L   (150-450)  k/uL


 


Lymphocytes #  0.4 L   (1.0-4.8)  k/uL


 


Sodium   134 L  (137-145)  mmol/L


 


BUN   33 H  (9-20)  mg/dL


 


Creatinine   2.23 H  (0.66-1.25)  mg/dL


 


Calcium   6.5 L  (8.4-10.2)  mg/dL


 


Total Bilirubin   10.2 H  (0.2-1.3)  mg/dL


 


AST   119 H  (17-59)  U/L


 


Alkaline Phosphatase   347 H  ()  U/L


 


Total Protein   5.8 L  (6.3-8.2)  g/dL


 


Albumin   2.3 L  (3.5-5.0)  g/dL














Assessment and Plan


Plan: 





1. 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 

failure: Ultrasound showed widespread liver metastasis, oncology on consult,  

MRCP as above.  GI to determine next steps.





2.  Acute on chronic diastolic heart failure: Cardiology on consult, 

echocardiogram resulted, 





3.  Acute kidney injury secondary to dehydration from poor oral intake. 

nephrology on consult, Bactrim, losartan and amlodipine held.  Patient was 

placed on a bicarb drip.





4.  Hypertension: Daily medications Norvasc 5 mg daily and Cozaar 150 mg held 

due to hypotension and acute kidney injury.





5.  Enterobacter urinary tract infection: Bactrim as outpatient discontinued, 

urine cultures are pending, started on ceftriaxone.





6.  Paroxysmal atrial fibrillation: Currently rate controlled, not a candidate 

for anticoagulation





7.  Hypotension.  Blood pressure medications on hold





6.  GI, DVT prophylaxis: Continue Protonix, SEDs 





Discharge plan: Home with home care or hospice in the next 24 hours.


The above impression and plan of care have been discussed and directed by 

signing physician. Diane Barbour nurse practitioner acting as scribe for signing 

physician.

## 2019-02-06 PROCEDURE — 0F798DZ DILATION OF COMMON BILE DUCT WITH INTRALUMINAL DEVICE, VIA NATURAL OR ARTIFICIAL OPENING ENDOSCOPIC: ICD-10-PCS

## 2019-02-06 RX ADMIN — FLUTICASONE PROPIONATE SCH: 50 SPRAY, METERED NASAL at 16:57

## 2019-02-06 RX ADMIN — POTASSIUM CHLORIDE SCH MLS: 14.9 INJECTION, SOLUTION INTRAVENOUS at 15:01

## 2019-02-06 RX ADMIN — Medication SCH MG: at 11:36

## 2019-02-06 RX ADMIN — POTASSIUM CHLORIDE SCH MLS: 14.9 INJECTION, SOLUTION INTRAVENOUS at 14:48

## 2019-02-06 RX ADMIN — MORPHINE SULFATE PRN MG: 4 INJECTION, SOLUTION INTRAMUSCULAR; INTRAVENOUS at 17:41

## 2019-02-06 RX ADMIN — Medication SCH UNIT: at 11:36

## 2019-02-06 RX ADMIN — MORPHINE SULFATE PRN MG: 4 INJECTION, SOLUTION INTRAMUSCULAR; INTRAVENOUS at 21:49

## 2019-02-06 RX ADMIN — Medication SCH MG: at 19:54

## 2019-02-06 RX ADMIN — PANTOPRAZOLE SODIUM SCH MG: 40 TABLET, DELAYED RELEASE ORAL at 06:13

## 2019-02-06 NOTE — FL
EXAMINATION TYPE: FL ERCP biliary duct only

 

DATE OF EXAM: 2/6/2019

 

COMPARISON: MRCP 4 2018

 

HISTORY: Biliary dilation, elevated bilirubin

 

Fluoroscopy support supplied to the referring clinician.  See dictated report from gastroenterology, 
3 intraoperative images were obtained, 16 seconds fluoroscopy time

## 2019-02-06 NOTE — PN
PROGRESS NOTE



The patient is seen for followup for acute kidney injury.  This morning, patient is

sitting up in bed.  He is comfortable.  He is not in any acute distress.  The patient

will be going down for ERCP today.



Blood pressure this morning was 85/55, heart rate 74 per minute.  Patient is afebrile.

Examination of the heart S1, S2.  Examination of the lungs bilateral breath sounds are

heard.  Abdomen is soft, nontender.  Examination of lower extremities shows edema 1+

bilaterally.  CNS exam is grossly intact.  The patient is jaundiced.



LABS SHOW:

Sodium 134, potassium 4.2, serum creatinine 2.23 from yesterday.



ASSESSMENT:

1. Acute kidney injury, acute tubular necrosis, currently nonoliguric.  Maintained on

    gentle IV hydration.

2. History of prostatic cancer with bladder mass status post cystectomy, ileal loop

    urostomy and colostomy.

3. Obstructive jaundice secondary to liver metastasis going for a biliary stent today.

4. Hypertension.  Blood pressure is currently low.  Angiotensin receptor blockers are

    on hold.

5. Urinary tract infection with Enterobacter.

6. Paroxysmal atrial fibrillation with controlled ventricular response.



PLAN:

Continue with gentle IV hydration.  Continue to encourage increased oral intake.

Repeat labs in a.m.





MMODL / IJN: [id number] / Job#: [id number]

## 2019-02-06 NOTE — P.PCN
Date of Procedure: 02/06/19


Description of Procedure: 


Brief history: 


The patient is a 78-year-old male with medical history of metastatic prostate 

cancer status post radical prostatectomy, radiation therapy and multiple lines 

of chemotherapy was found to have metastatic disease in 2015 when he underwent 

colon and bladder resection information of a colostomy and urostomy who 

presented to the hospital with complaints of weakness, fatigue and jaundice.  

The patient has been on multiple chemotherapies in the past most recently 

treated with Cabozantinib, which was stopped approximately one week ago due to 

diarrhea, decreased appetite and indigestion.  He presented to the hospital 

reporting increased fatigue, and shortness of breath worse with exertion which 

had been progressing prior to presentation.  The patient also reports that over 

the past few weeks he has noticed increasing jaundice and darkness of his 

urine.  On presentation to the hospital the patient had a WBC count 4, RBC 9.2, 

platelets 162,000, INR 1.3, total bilirubin 11, alkaline phosphatase 331, AST 

111, ALT 44.  The patient also had an ultrasound which showed multiple liver 

lesions with the largest measuring 7.4 x 5.8 cm as well as gallbladder wall 

thickening.  MRI was ordered and suggestive of multiple large liver lesions 

with biliary dilation.  Total bilirubin remained elevated an ERCP was scheduled 

for stent placement.





Procedure performed: 


ERCP with cholangiogram, sphincterotomy, balloon sweep and stent placement





Preoperative diagnoses:


Malignant biliary obstruction, elevated bilirubin





IV sedation per anesthesia





Estimated blood loss: 


Minimal.





Procedure:


After informed consent was obtained from the patient and after the risks 

benefits and complications including bleeding perforation and pancreatitis 

explained in detail the patient was brought into the endoscopy unit.  The 

patient was placed in prone position and IV conscious sedation was administered 

by anesthesia.  The Olympus side-viewing duodenoscope was then inserted into 

the mouth and esophagus intubated without any difficulty.  The scope was 

gradually advanced into the stomach and duodenum.  The major papilla was 

identified without any difficulty.  Cannulation of the major papilla was 

performed with a sphincterotome.  A wire was then passed into the common bile 

duct.  Cholangiogram was performed and confirmed wire placement in the bile 

duct with a minimally dilated CBD with possible filling defect in the lower 

common bile duct noted.  A 8 mm sphincterotomy was then performed.  The 

sphincterotome was then withdrawn through the scope and a balloon was passed 

over the wire into the common bile duct and inflated to 8.5 mm.  Multiple 

passes of the common bile duct with the inflated balloon were performed with no 

choledocholithiasis seen.  Good bile flow was noted.  A 5 cm 7-Georgian double 

pigtailed plastic stent was then successfully placed into the common bile duct.

  The procedure was then completed and the side-viewing duodenum scope 

withdrawn.  The patient tolerated the procedure well.





Impression:


1.  ERCP with cholangiogram, sphincterotomy, balloon sweep and placement of a 

plastic double pigtailed stent


2.  Good bile flow noted at the end of the procedure





Recommendations: 


The findings of this examination were discussed with the patient as well as a 

family.  Okay for full liquid diet tonight.  Monitor for signs and symptoms of 

pancreatitis.  Monitor liver enzymes.  The patient stent should be removed in 6-

8 weeks and replaced with a metal stent pending patient's clinical status and 

course.  The gastroenterology team will stand by, please call us back with any 

questions or concerns.

## 2019-02-06 NOTE — P.PN
Subjective


Progress Note Date: 02/06/19


Principal diagnosis: 





liver failure





In f/u pt maintains his sense of humor, no physical c/o on a 10 point ROS 





Objective





- Vital Signs


Vital signs: 


 Vital Signs











Temp  98 F   02/06/19 15:01


 


Pulse  74   02/06/19 16:00


 


Resp  16   02/06/19 15:30


 


BP  108/56   02/06/19 15:30


 


Pulse Ox  93 L  02/06/19 15:30








 Intake & Output











 02/05/19 02/06/19 02/06/19





 18:59 06:59 18:59


 


Intake Total 350 40 600


 


Output Total  300 


 


Balance 350 -260 600


 


Weight 78.9 kg  


 


Intake:   


 


  IV   600


 


  Intake, IV Titration 350  0





  Amount   


 


    Dextrose 5% in Water 1, 350  





    000 ml @ 50 mls/hr IV .   





    Q23H TRISH with Sodium   





    Bicarb (1 Meq/ml) 150 ml   





    Rx#:[id number]   


 


    cefTRIAXone 1,000 mg In   0





    Sodium Chloride 0.9% 50   





    ml @ 100 mls/hr IVPB HS   





    TRISH Rx#:[id number]   


 


  Oral 0 40 


 


Output:   


 


  Urine  300 


 


Other:   


 


  # Voids 0  


 


  # Bowel Movements 0  














- Exam





Pt laying in bed NAD, A&O x 4, respirations even and unlabored





- Labs


CBC & Chem 7: 


 02/05/19 06:00





 02/05/19 06:00





Assessment and Plan


(1) Congestive heart failure


Current Visit: Yes   Status: Acute   Priority: High   Code(s): I50.9 - HEART 

FAILURE, UNSPECIFIED   SNOMED Code(s): 11907463


   





(2) Elevated liver enzymes


Narrative/Plan: 


Pending GI evaluation to see if jaundice is due to obstruction that could be 

relieved through intervention.


Current Visit: Yes   Status: Acute   Priority: High   Code(s): R74.8 - ABNORMAL 

LEVELS OF OTHER SERUM ENZYMES   SNOMED Code(s): 344142621


   





(3) Liver failure


Current Visit: Yes   Status: Acute   Priority: High   Code(s): K72.90 - HEPATIC 

FAILURE, UNSPECIFIED WITHOUT COMA   SNOMED Code(s): 85861426


   





(4) Generalized weakness


Current Visit: Yes   Status: Acute   Priority: High   Code(s): R53.1 - WEAKNESS

   SNOMED Code(s): 03268018


   





(5) Prostate cancer


Current Visit: Yes   Status: Acute   Priority: High   Code(s): C61 - MALIGNANT 

NEOPLASM OF PROSTATE   SNOMED Code(s): 712278484


   


Plan: 





 Attests: I have performed H&P and developed impression and plan of care of 

patient, discussed with dictator.  I agree with dictated note, documented as a 

scribe.

## 2019-02-07 LAB
ALBUMIN SERPL-MCNC: 2.3 G/DL (ref 3.5–5)
ALP SERPL-CCNC: 310 U/L (ref 38–126)
ALT SERPL-CCNC: 43 U/L (ref 21–72)
ANION GAP SERPL CALC-SCNC: 10 MMOL/L
AST SERPL-CCNC: 111 U/L (ref 17–59)
BUN SERPL-SCNC: 39 MG/DL (ref 9–20)
CALCIUM SPEC-MCNC: 6.3 MG/DL (ref 8.4–10.2)
CHLORIDE SERPL-SCNC: 103 MMOL/L (ref 98–107)
CO2 SERPL-SCNC: 23 MMOL/L (ref 22–30)
ERYTHROCYTE [DISTWIDTH] IN BLOOD BY AUTOMATED COUNT: 2.95 M/UL (ref 4.3–5.9)
ERYTHROCYTE [DISTWIDTH] IN BLOOD: 18.5 % (ref 11.5–15.5)
GLUCOSE SERPL-MCNC: 81 MG/DL (ref 74–99)
HCT VFR BLD AUTO: 30.6 % (ref 39–53)
HGB BLD-MCNC: 9.2 GM/DL (ref 13–17.5)
MCH RBC QN AUTO: 31.2 PG (ref 25–35)
MCHC RBC AUTO-ENTMCNC: 30 G/DL (ref 31–37)
MCV RBC AUTO: 103.8 FL (ref 80–100)
PLATELET # BLD AUTO: 165 K/UL (ref 150–450)
POTASSIUM SERPL-SCNC: 4.5 MMOL/L (ref 3.5–5.1)
PROT SERPL-MCNC: 5.8 G/DL (ref 6.3–8.2)
SODIUM SERPL-SCNC: 136 MMOL/L (ref 137–145)
WBC # BLD AUTO: 4.5 K/UL (ref 3.8–10.6)

## 2019-02-07 RX ADMIN — METOCLOPRAMIDE PRN MG: 5 INJECTION, SOLUTION INTRAMUSCULAR; INTRAVENOUS at 14:39

## 2019-02-07 RX ADMIN — Medication SCH MG: at 22:43

## 2019-02-07 RX ADMIN — Medication SCH MG: at 07:44

## 2019-02-07 RX ADMIN — CEFAZOLIN SCH MLS/HR: 330 INJECTION, POWDER, FOR SOLUTION INTRAMUSCULAR; INTRAVENOUS at 14:36

## 2019-02-07 RX ADMIN — ONDANSETRON PRN MG: 2 INJECTION INTRAMUSCULAR; INTRAVENOUS at 09:58

## 2019-02-07 RX ADMIN — CEFAZOLIN SCH MLS/HR: 330 INJECTION, POWDER, FOR SOLUTION INTRAMUSCULAR; INTRAVENOUS at 22:47

## 2019-02-07 RX ADMIN — FLUTICASONE PROPIONATE SCH SPRAY: 50 SPRAY, METERED NASAL at 14:36

## 2019-02-07 RX ADMIN — PANTOPRAZOLE SODIUM SCH MG: 40 TABLET, DELAYED RELEASE ORAL at 07:44

## 2019-02-07 RX ADMIN — Medication SCH: at 14:36

## 2019-02-07 NOTE — P.PN
Subjective


Progress Note Date: 02/07/19


Principal diagnosis: 


Obstructive jaundice





Status post ERCP biliary stent placement.  Total bilirubin 9.4 slightly 

improved.  No abdominal complaints.  Afebrile.








Objective





- Vital Signs


Vital signs: 


 Vital Signs











Temp  96.2 F L  02/07/19 12:15


 


Pulse  82   02/07/19 12:15


 


Resp  16   02/07/19 12:15


 


BP  90/54   02/07/19 12:15


 


Pulse Ox  95   02/07/19 12:15








 Intake & Output











 02/06/19 02/07/19 02/07/19





 18:59 06:59 18:59


 


Intake Total 600 640 


 


Output Total  420 


 


Balance 600 220 


 


Intake:   


 


    


 


  Intake, IV Titration 0  





  Amount   


 


    cefTRIAXone 1,000 mg In 0  





    Sodium Chloride 0.9% 50   





    ml @ 100 mls/hr IVPB HS   





    TRISH Rx#:[id number]   


 


  Oral  640 


 


Output:   


 


  Urine  420 


 


Other:   


 


  # Voids  1 














- Exam





General appearance: The patient is alert, oriented, in no acute distress.  

Jaundice.


HET: Head is normocephalic and atraumatic.  Pupils are equal and reactive.  

Oropharynx is clear without lesions.  Sclerae icterus.


Neck: Supple without lymphadenopathy.  Trachea midline.


Heart: S1 S2.  Regular rate and rhythm.


Lungs: No crackles or wheezes are heard.


Abdomen: Soft, nontender, nondistended with  bowel sounds.  No peritoneal 

signs.  No palpable organomegaly or masses.


Extremities: Normal skin color and turgor.  No cyanosis, rash, ulceration, 

clubbing, or edema.  Radial and pedal pulses are 2/4 bilaterally.


Neurological: No focal deficits.  Strength and sensation are grossly intact.





- Labs


CBC & Chem 7: 


 02/07/19 07:06





 02/07/19 07:06


Labs: 


 Abnormal Lab Results - Last 24 Hours (Table)











  02/07/19 02/07/19 Range/Units





  07:06 07:06 


 


RBC  2.95 L   (4.30-5.90)  m/uL


 


Hgb  9.2 L   (13.0-17.5)  gm/dL


 


Hct  30.6 L   (39.0-53.0)  %


 


MCV  103.8 H   (80.0-100.0)  fL


 


MCHC  30.0 L   (31.0-37.0)  g/dL


 


RDW  18.5 H   (11.5-15.5)  %


 


Sodium   136 L  (137-145)  mmol/L


 


BUN   39 H  (9-20)  mg/dL


 


Creatinine   2.73 H  (0.66-1.25)  mg/dL


 


Calcium   6.3 L*  (8.4-10.2)  mg/dL


 


Total Bilirubin   9.4 H  (0.2-1.3)  mg/dL


 


AST   111 H  (17-59)  U/L


 


Alkaline Phosphatase   310 H  ()  U/L


 


Total Protein   5.8 L  (6.3-8.2)  g/dL


 


Albumin   2.3 L  (3.5-5.0)  g/dL














Assessment and Plan


Assessment: 


Impression:





1.  Obstructive jaundice malignant status post ERCP biliary stent placement 

with mild improvement in liver function tests.  


2.  Metastatic prostate cancer.








Plan:


1.  Continue monitoring liver function tests.  Diet as tolerated.  Follow-up in 

office in 2-3 weeks for reevaluation and discussion of outpatient ERCP 

placement of biliary stent with metal stent pending his clinical course.





Assessment and plan a care discussed with Dr. Fontanez

## 2019-02-07 NOTE — US
EXAMINATION TYPE: US kidneys/renal and bladder

 

DATE OF EXAM: 2/7/2019

 

COMPARISON: CT

 

CLINICAL HISTORY: luis.

 

EXAM MEASUREMENTS:

 

Right Kidney:  8.6 x 5.5 x 4.8 cm

Left Kidney: 9.8 x 5.2 x 4.5 cm

 

 

Technically difficult study. Patient has ascites, metastatic liver disease, no bladder.

 

Right Kidney: No hydronephrosis or masses seen, atrophic.  

Left Kidney: No hydronephrosis or masses seen  

Bladder: surgically absent

 

 

Limited visualization of bilateral kidneys.

 

 

IMPRESSION:

Right kidney somewhat atrophic with no definite hydronephrosis or nephrolithiasis. There is a small a
mount of ascites.

## 2019-02-07 NOTE — P.PN
Subjective


Progress Note Date: 02/07/19





This is a 78-year-old male, patient of Dr. Saavedra.  He has a medical 

history of prostate cancer that was diagnosed in 2009 status post prostatectomy 

with radiation treatments.  Patient was in remission until 2015 when he 

developed hematuria, at that time he was found to have a bladder mass and 

underwent resection with colostomy and urostomy.  He is receiving current 

therapy with Cabozantinib and sees oncology on regular basis.  Patient reports 

about a week ago he discontinued the Cabozantinib due to diarrhea, nausea, and 

vomiting.  He did call his oncologist office who advised him to go to the 

emergency room to be evaluated.  Patient's lab revealed hemoglobin 9.2, BUN 28, 

creatinine 1.91, total bilirubin 10.6, alk phos 334.  Ultrasound showed no 

gallstones, ascites, extensive heterogenicity in the liver, consistent with 

tumor, and mild gallbladder wall thickening.  Echocardiogram revealed atrial 

fibrillation, borderline left ventricular hypertrophy, ejection fraction 55-60%

, moderate aortic stenosis, mild aortic regurgitation, no evidence of pulmonary 

hypertension, large pleural effusion.  Chest x-ray showed atelectasis and 

effusions.  Cardiology consulted, as well as nephrology and oncology. 








2/3: Patient evaluated today, noted to be up in bedside chair.  He denies any 

nausea, vomiting, diarrhea, chest pain or dizziness.  MRCP has been ordered 

without contrast, will most likely be completed tomorrow.  Cardiology, 

nephrology and oncology consult appreciated.  He continues with IV fluids, 

losartan, amlodipine and Bactrim were discontinued due to acute kidney injury 

as per nephrology's recommendations.  Bactrim was changed to ceftriaxone.  

Conjugated bilirubin 4.8, unconjugated bilirubin 1.4, total bilirubin 10.6, AST 

114, ALT 47, alk phos 334.  Vital signs are stable, blood pressure 96/59, heart 

rate 78, he remains afebrile.





2/4: Patient states he feels a little bit better from yesterday.  He states he 

slept well.  He did receive morphine last night that helped.  He denies any 

shortness of breath while flat in bed.  He has had very little output from his 

colostomy but is eating very little.  Andrade catheter is a medium color orange.  

He is scheduled for MRCP today at 4 PM, ordered by GI to evaluate biliary tree.

  Initially plan was home with hospice but family states that they have seen 

Dr. Price and there may be plan for aggressive treatment.  Cardiology is 

following.  Patient is not candidate for anticoagulation with paroxysmal atrial 

fibrillation.  He is not currently on Lasix.  Nephrology continues to follow 

the patient.  Patient has been afebrile, blood pressure 110/63, heart rate 

running between 60 and 106, pulse ox 98% on room air.  White count is 3.4, 

hemoglobin 9.9, platelet count 167, , ALT 44, alkaline phosphatase 360.  

BUN 28 and creatinine 1.86.  Urine culture is positive for Enterobacter cloacae 

which is susceptible to ceftriaxone.  Patient will be transferred to the 

[facility] floor without telemetry.  Case management is following for discharge 

planning.





2/5: MRCP is limited by patient's body habitus.  Metastatic disease.  Bilateral 

pleural effusions.  Ascites.  There is some indication of biliary dilatation.  

Study is to be reviewed by  and radiology to determine plan.  If ERCP/

stent will not be of benefit, most likely patient will be placed under hospice 

and discharged home.  Renal function is worsening with BUN 33 and creatinine 

2.23.  Patient is followed by Dr. Rodas.  Patient states that he is not eating 

very much and is on a protein supplement.  He has been afebrile, blood pressure 

8458-139/65, pulse ox 92% on room air, heart rate running in the 60s to 80s.  

Hemoglobin is 9, platelet count 141.  Total bilirubin 10.2, , ALT 43, 

alkaline phosphatase 347.





2/6: Patient is scheduled for ERCP today.  He denies any abdominal pain.  He 

continues to have jaundice.  Patient remains afebrile, heart rate running in 

the 70s, pulse ox 93% on room air, blood pressure 85/55.  Discussed ostomy with 

the patient's wife and she is requesting Neosporin as this is what she normally 

gets around the area to prevent inflammation and irritation.  Recommend Flonase 

nasal spray be used at this area as it also contains some steroids to help with 

inflammation.  We will try this in the hospital.





2/7: Yesterday, patient underwent ERCP with cholangiogram, sphincterotomy, 

balloon sweep and stent placement with Dr. Fontanez.  Patient was cleared for 

full liquid diet last night and plan to monitor for pancreatitis.  Patient have 

the stent removed and 6-8 weeks and replace with metal stent.  Repeat lab work 

reveals white count 4.5, hemoglobin 9.2, platelet count 165.  BUN 39, 

creatinine 2.73, total bilirubin slightly improved at 9.4, , ALT 43, 

alkaline phosphatase 310.  Calcium replace by Dr. Rendon.  He has recommended 

IV fluids at 75 mL per hour. Encourage increased oral intake.  Patient denies 

any pain.  He is requesting that his IMV advance which will be changed to 

regular.  He is asking for cherry pie.  Family members present.  The bedside.  

All questions have been answered.  Plan to monitor liver test and once he is 

tolerating diet, discharged home.





Review Of Systems:


Constitutional: No fever, no chills, no night sweats.  No weight change.  

Reports weakness, reports fatigue reports lethargy.  


EENT: No headache.  No blurred vision or double vision, no loss of vision.  No 

loss of Hearing, no ringing in the ears, no dizziness.  No nasal drainage or 

congestion.  No epistaxis.  No sore throat.


Lungs: No shortness of breath, cough, no sputum production.  No wheezing.


Cardiovascular: No chest pain, no lower extremity edema.  No palpitations.  No 

paroxysmal nocturnal dyspnea.  No orthopnea.  No lightheadedness or dizziness.  

No syncopal episodes.


Abdominal: No abdominal pain.  No nausea, vomiting.  No diarrhea.  No 

constipation.  No bloody or tarry stools.  Denies loss of appetite.


Genitourinary: No dysuria, increased frequency, urgency.  No urinary retention.


Musculoskeletal: No myalgias.  No muscle weakness, no gait dysfunction, no 

frequent falls.  No back pain.  No neck pain.


Integumentary: No wounds, no lesions.  No rash or pruritus.  No unusual 

bruising.  No change in hair or nails.


Neurologic: No aphasia. No facial droop. No change in mentation. No head 

injury. No headache. No paralysis. No paresthesia.


Psychiatric: No depression.  No anxiety.  No mood swings.


Endocrine: No abnormal blood sugars.  No weight change.  No excessive sweating 

or thirst.  No cold intolerance. 








Objective





- Vital Signs


Vital signs: 


 Vital Signs











Temp  97.9 F   02/07/19 04:44


 


Pulse  96   02/07/19 04:44


 


Resp  16   02/07/19 04:44


 


BP  87/53   02/07/19 04:44


 


Pulse Ox  93 L  02/07/19 04:44








 Intake & Output











 02/06/19 02/07/19 02/07/19





 18:59 06:59 18:59


 


Intake Total 600 640 


 


Output Total  420 


 


Balance 600 220 


 


Intake:   


 


    


 


  Intake, IV Titration 0  





  Amount   


 


    cefTRIAXone 1,000 mg In 0  





    Sodium Chloride 0.9% 50   





    ml @ 100 mls/hr IVPB HS   





    TRISH Rx#:[id number]   


 


  Oral  640 


 


Output:   


 


  Urine  420 


 


Other:   


 


  # Voids  1 














- Exam





General appearance: cooperative, no acute distress, patient resting in bed.  

Multiple family members are at the bedside.





- EENT


Eyes: EOMI, PERRLA, scleral icterus


ENT: hearing grossly normal, normal oropharynx





- Neck


Neck: no lymphadenopathy, normal ROM, no stridor, no thyromegaly


Thyroid: bilateral: normal size, negative: firm, nodule





- Respiratory


Respiratory: bilateral: CTA, negative: dullness, rales, rhonchi, wheezing





- Cardiovascular





 Bilateral lower extremity edema


Rhythm: irregularly irregular


Heart sounds: normal: S1, S2





- Gastrointestinal





Colostomy and urostomy


General gastrointestinal: no distended, normal bowel sounds, soft, no tenderness





- Integumentary


Integumentary: jaundiced





- Neurologic


Neurologic: CNII-XII intact





- Musculoskeletal


Musculoskeletal: generalized weakness, strength equal bilaterally, no right 

sided weakness, no left sided weakness





- Psychiatric


Psychiatric: A&O x's 3





- Labs


CBC & Chem 7: 


 02/07/19 07:06





 02/07/19 07:06


Labs: 


 Abnormal Lab Results - Last 24 Hours (Table)











  02/07/19 Range/Units





  07:06 


 


RBC  2.95 L  (4.30-5.90)  m/uL


 


Hgb  9.2 L  (13.0-17.5)  gm/dL


 


Hct  30.6 L  (39.0-53.0)  %


 


MCV  103.8 H  (80.0-100.0)  fL


 


MCHC  30.0 L  (31.0-37.0)  g/dL


 


RDW  18.5 H  (11.5-15.5)  %














Assessment and Plan


Plan: 





1. 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 

failure: Ultrasound showed widespread liver metastasis, oncology on consult,  

MRCP as above.  Status post ERCP and stent.





2.  Acute on chronic diastolic heart failure: Cardiology on consult, 

echocardiogram resulted, 





3.  Acute kidney injury secondary and acute tubular necrosis to dehydration 

from poor oral intake. Bactrim, losartan and amlodipine held.  Continue IV 

hydration and increase oral intake.  Oral sodium bicarb Nephrology consult 

appreciated.





4.  Hypertension: Daily medications Norvasc 5 mg daily and Cozaar 150 mg held 

due to hypotension and acute kidney injury.





5.  Enterobacter urinary tract infection: Bactrim as outpatient discontinued, 

urine cultures are pending, started on ceftriaxone.





6.  Paroxysmal atrial fibrillation: Currently rate controlled, not a candidate 

for anticoagulation





7.  Hypotension.  Blood pressure medications on hold





6.  GI, DVT prophylaxis: Continue Protonix, SEDs 





7.  Hypocalcemia, status post replacement





Discharge plan: Home with home care in the next 24 hours.


The above impression and plan of care have been discussed and directed by 

signing physician. Diane Barbour nurse practitioner acting as scribe for signing 

physician.

## 2019-02-07 NOTE — P.PN
Subjective


Progress Note Date: 02/07/19


Principal diagnosis: 





liver failure





In f/u pt has c/o confusion last night, better today, he is more alert, moving 

more, family noted improvements in mentation and activity.  Pt denies fever, 

nausea, abd pain, he has mild bloating.  





Objective





- Vital Signs


Vital signs: 


 Vital Signs











Temp  96.2 F L  02/07/19 12:15


 


Pulse  82   02/07/19 12:15


 


Resp  16   02/07/19 12:15


 


BP  90/54   02/07/19 12:15


 


Pulse Ox  95   02/07/19 12:15








 Intake & Output











 02/06/19 02/07/19 02/07/19





 18:59 06:59 18:59


 


Intake Total 600 640 


 


Output Total  420 


 


Balance 600 220 


 


Intake:   


 


    


 


  Intake, IV Titration 0  





  Amount   


 


    cefTRIAXone 1,000 mg In 0  





    Sodium Chloride 0.9% 50   





    ml @ 100 mls/hr IVPB HS   





    [facility] Rx#:[id number]   


 


  Oral  640 


 


Output:   


 


  Urine  420 


 


Other:   


 


  # Voids  1 














- Constitutional


General appearance: Present: cooperative, no acute distress, thin





- EENT


EENT Comment(s): 





scleral icterus looks significantly less


Eyes: Present: EOMI


ENT: Present: hearing grossly normal





- Respiratory


Respiratory: bilateral: CTA, diminished





- Cardiovascular


Rhythm: regular


Heart sounds: normal: S1, S2





- Peripheral edema


  ** leg


Peripheral Edema: bilateral: None





- Gastrointestinal


General gastrointestinal: Present: distended, normal bowel sounds, soft





- Integumentary


Integumentary Comment(s): 





jaundice better, pt has some pink to his cheeks





- Neurologic


Neurologic: Present: CNII-XII intact





- Musculoskeletal


Musculoskeletal: Present: generalized weakness





- Psychiatric


Psychiatric Comment(s): 





He does admit to having some confusion last night during room transfer


Psychiatric: Present: A&O x's 3, appropriate affect, intact judgment & insight





- Labs


CBC & Chem 7: 


 02/07/19 07:06





 02/07/19 07:06


Labs: 


 Abnormal Lab Results - Last 24 Hours (Table)











  02/07/19 02/07/19 Range/Units





  07:06 07:06 


 


RBC  2.95 L   (4.30-5.90)  m/uL


 


Hgb  9.2 L   (13.0-17.5)  gm/dL


 


Hct  30.6 L   (39.0-53.0)  %


 


MCV  103.8 H   (80.0-100.0)  fL


 


MCHC  30.0 L   (31.0-37.0)  g/dL


 


RDW  18.5 H   (11.5-15.5)  %


 


Sodium   136 L  (137-145)  mmol/L


 


BUN   39 H  (9-20)  mg/dL


 


Creatinine   2.73 H  (0.66-1.25)  mg/dL


 


Calcium   6.3 L*  (8.4-10.2)  mg/dL


 


Total Bilirubin   9.4 H  (0.2-1.3)  mg/dL


 


AST   111 H  (17-59)  U/L


 


Alkaline Phosphatase   310 H  ()  U/L


 


Total Protein   5.8 L  (6.3-8.2)  g/dL


 


Albumin   2.3 L  (3.5-5.0)  g/dL














Assessment and Plan


(1) Congestive heart failure


Narrative/Plan: 


Cardiology management


Current Visit: Yes   Status: Acute   Priority: High   Code(s): I50.9 - HEART 

FAILURE, UNSPECIFIED   SNOMED Code(s): 08899722


   





(2) Elevated liver enzymes


Narrative/Plan: 


Improved post stenting


Current Visit: Yes   Status: Acute   Priority: High   Code(s): R74.8 - ABNORMAL 

LEVELS OF OTHER SERUM ENZYMES   SNOMED Code(s): 258497148


   





(3) Liver failure


Narrative/Plan: 


Pt had biliary stenting with significant clinical improvement. 


Current Visit: Yes   Status: Acute   Priority: High   Code(s): K72.90 - HEPATIC 

FAILURE, UNSPECIFIED WITHOUT COMA   SNOMED Code(s): 14357219


   





(4) Generalized weakness


Narrative/Plan: 


Pt is planning on home care with PT/OT.  


Current Visit: Yes   Status: Acute   Priority: High   Code(s): R53.1 - WEAKNESS

   SNOMED Code(s): 71298897


   





(5) Prostate cancer


Narrative/Plan: 


Hold cabometyx.  F/U scheduled with terence Banks in chart.  Plan of care 

will be determined then.  Pt will cont lupron and RANK ligand inhibitor 

outpatient.  


Current Visit: Yes   Status: Acute   Priority: High   Code(s): C61 - MALIGNANT 

NEOPLASM OF PROSTATE   SNOMED Code(s): 026450849

## 2019-02-07 NOTE — P.PN
Subjective





Patient is seen in follow-up for acute kidney injury.  Renal function is worse 

today with creatinine up to 2.93.  Patient had a biliary stent placed 

yesterday.  He has a urostomy in place.  Oral intake remains poor.  Denies 

chest pain or shortness of breath.





Vital signs are stable.


General: The patient appeared well nourished and normally developed.  Appears 

jaundiced.


HEENT: Head exam is unremarkable. Neck is without jugular venous distension.


LUNGS: Lungs are clear to auscultation and percussion. Breath sounds decreased.


HEART: Rate and Rhythm are regular. First and second heart sounds normal. No 

murmurs, rubs or gallops. 


ABDOMEN: Abdominal exam reveals normal bowel sounds. Non-tender and non-

distended. No evidence of peritonitis.


EXTREMITITES: No clubbing, cyanosis, or edema.





Objective





- Vital Signs


Vital signs: 


 Vital Signs











Temp  97.9 F   02/07/19 04:44


 


Pulse  96   02/07/19 08:00


 


Resp  16   02/07/19 08:00


 


BP  87/53   02/07/19 04:44


 


Pulse Ox  93 L  02/07/19 04:44








 Intake & Output











 02/06/19 02/07/19 02/07/19





 18:59 06:59 18:59


 


Intake Total 600 640 


 


Output Total  420 


 


Balance 600 220 


 


Intake:   


 


    


 


  Intake, IV Titration 0  





  Amount   


 


    cefTRIAXone 1,000 mg In 0  





    Sodium Chloride 0.9% 50   





    ml @ 100 mls/hr IVPB HS   





    [facility] Rx#:[id number]   


 


  Oral  640 


 


Output:   


 


  Urine  420 


 


Other:   


 


  # Voids  1 














- Labs


CBC & Chem 7: 


 02/07/19 07:06





 02/07/19 07:06


Labs: 


 Abnormal Lab Results - Last 24 Hours (Table)











  02/07/19 02/07/19 Range/Units





  07:06 07:06 


 


RBC  2.95 L   (4.30-5.90)  m/uL


 


Hgb  9.2 L   (13.0-17.5)  gm/dL


 


Hct  30.6 L   (39.0-53.0)  %


 


MCV  103.8 H   (80.0-100.0)  fL


 


MCHC  30.0 L   (31.0-37.0)  g/dL


 


RDW  18.5 H   (11.5-15.5)  %


 


Sodium   136 L  (137-145)  mmol/L


 


BUN   39 H  (9-20)  mg/dL


 


Creatinine   2.73 H  (0.66-1.25)  mg/dL


 


Calcium   6.3 L*  (8.4-10.2)  mg/dL


 


Total Bilirubin   9.4 H  (0.2-1.3)  mg/dL


 


AST   111 H  (17-59)  U/L


 


Alkaline Phosphatase   310 H  ()  U/L


 


Total Protein   5.8 L  (6.3-8.2)  g/dL


 


Albumin   2.3 L  (3.5-5.0)  g/dL














Assessment and Plan


Plan: 





Assessment:


1.  Acute kidney injury secondary to ATN.  Concern for pigmented nephropathy 

from elevated bilirubin.  Creatinine 2.73 today.


2.  History of prostate cancer with bladder mass status post cystectomy, ileal 

loop urostomy and colostomy placement.  Oncology following.


3.  Obstructive jaundice secondary to metastatic disease status post biliary 

stent placement on February 6.


4.  UTI with urine culture positive for Enterobacter.  Maintained on 

antibiotics.


5.  Hypocalcemia secondary to acute kidney injury.  Corrected calcium for 

albumin is near 7.4.


6.  History of hypertension.  Blood pressure low this morning.


7.  Metabolic acidosis secondary to acute kidney injury maintained on oral 

sodium bicarbonate.





Plan:


I will increase rate of normal saline to 75 mL an hour.


1 g IV calcium today.


No evidence of urinary retention.


Repeat electrolytes in the morning.

## 2019-02-08 LAB
ALBUMIN SERPL-MCNC: 2.6 G/DL (ref 3.5–5)
ALP SERPL-CCNC: 334 U/L (ref 38–126)
ALT SERPL-CCNC: 40 U/L (ref 21–72)
ANION GAP SERPL CALC-SCNC: 13 MMOL/L
AST SERPL-CCNC: 127 U/L (ref 17–59)
BUN SERPL-SCNC: 44 MG/DL (ref 9–20)
CALCIUM SPEC-MCNC: 6.8 MG/DL (ref 8.4–10.2)
CHLORIDE SERPL-SCNC: 105 MMOL/L (ref 98–107)
CO2 SERPL-SCNC: 20 MMOL/L (ref 22–30)
ERYTHROCYTE [DISTWIDTH] IN BLOOD BY AUTOMATED COUNT: 3.27 M/UL (ref 4.3–5.9)
ERYTHROCYTE [DISTWIDTH] IN BLOOD: 18.2 % (ref 11.5–15.5)
GLUCOSE SERPL-MCNC: 79 MG/DL (ref 74–99)
HCT VFR BLD AUTO: 33.8 % (ref 39–53)
HGB BLD-MCNC: 10.1 GM/DL (ref 13–17.5)
MCH RBC QN AUTO: 30.9 PG (ref 25–35)
MCHC RBC AUTO-ENTMCNC: 29.9 G/DL (ref 31–37)
MCV RBC AUTO: 103.4 FL (ref 80–100)
PLATELET # BLD AUTO: 185 K/UL (ref 150–450)
POTASSIUM SERPL-SCNC: 4.5 MMOL/L (ref 3.5–5.1)
PROT SERPL-MCNC: 6.6 G/DL (ref 6.3–8.2)
SODIUM SERPL-SCNC: 138 MMOL/L (ref 137–145)
WBC # BLD AUTO: 7.3 K/UL (ref 3.8–10.6)

## 2019-02-08 RX ADMIN — MORPHINE SULFATE PRN MG: 4 INJECTION, SOLUTION INTRAMUSCULAR; INTRAVENOUS at 13:50

## 2019-02-08 RX ADMIN — Medication SCH: at 12:14

## 2019-02-08 RX ADMIN — MORPHINE SULFATE PRN MG: 4 INJECTION, SOLUTION INTRAMUSCULAR; INTRAVENOUS at 20:01

## 2019-02-08 RX ADMIN — METOCLOPRAMIDE PRN MG: 5 INJECTION, SOLUTION INTRAMUSCULAR; INTRAVENOUS at 11:25

## 2019-02-08 RX ADMIN — Medication SCH MG: at 19:55

## 2019-02-08 RX ADMIN — Medication SCH MG: at 08:07

## 2019-02-08 RX ADMIN — FLUTICASONE PROPIONATE SCH: 50 SPRAY, METERED NASAL at 08:06

## 2019-02-08 RX ADMIN — ONDANSETRON PRN MG: 2 INJECTION INTRAMUSCULAR; INTRAVENOUS at 07:48

## 2019-02-08 RX ADMIN — TEMAZEPAM PRN MG: 15 CAPSULE ORAL at 19:55

## 2019-02-08 RX ADMIN — PANTOPRAZOLE SODIUM SCH MG: 40 TABLET, DELAYED RELEASE ORAL at 07:48

## 2019-02-08 RX ADMIN — CEFAZOLIN SCH: 330 INJECTION, POWDER, FOR SOLUTION INTRAMUSCULAR; INTRAVENOUS at 13:57

## 2019-02-08 NOTE — PN
PROGRESS NOTE



Patient is seen for followup for acute kidney injury.  Renal function has been

deteriorating.  There is no evidence of any urine retention.  IV fluids were increased

yesterday.  This morning, serum creatinine is up to 3.1, urine output remains low.  The

patient has Andrade catheter.  Family is present at bedside.



PHYSICAL EXAMINATION:

Blood pressure was 100/63, heart rate was 78 per minute, patient is afebrile.

Examination of the heart, S1, S2.  Examination of the lungs, bilateral breath sounds

are heard.  Abdomen is soft, nontender, distended.  Examination of the lower

extremities shows edema 1+ bilaterally.  CNS exam shows patient is moving all 4

extremities.



LABS:

Show sodium 138, potassium 4.5, BUN 44, serum creatinine 3.14.



ASSESSMENT:

1. Acute kidney injury, acute tubular necrosis, currently oliguric.  The patient is

    maintained on IV fluids.  He has an indwelling Andrade catheter as well.  I will give

    him 1 dose of Lasix.  Continue with the IV fluids for now.  Overall prognosis is

    guarded.  The patient's family has been advised.  He is not a candidate for any

    renal replacement therapy if his renal function continues to worsen.

2. Severe hyperbilirubinemia, status post biliary stent placement.

3. History of prostatic cancer.

4. Liver metastasis and obstructive jaundice.

5. Urinary tract infection with Enterobacter.

6. Paroxysmal atrial fibrillation with controlled ventricular response.



PLAN:

Continue with IV fluids.  Lasix x1.  Overall prognosis is guarded.





MMODL / IJN: [id number] / Job#: [id number]

## 2019-02-08 NOTE — XR
Abdomen

 

HISTORY: Distention, emesis

 

Frontal view of the abdomen on 2 images correlated to prior abdomen 7/23/2015, bone scan 10/12/2018, 
CT 10/12/2018

 

Postop change noted to the right hip. There is a double-J stent overlying the region of the duodenum 
and biliary system. Contrast material present within the gallbladder. Metallic coils present over the
 left hemiabdomen. There are vascular calcifications. Arthropathy noted in the left hip. Degenerative
 disc changes in the lumbar spine, mild spinal curvature. Sclerosis again noted within the pelvis com
patible with patient's history metastasis. Basilar increased density greater in the left hemithorax, 
there is retrocardiac density obscuring the hemidiaphragms. There are air-filled loops of bowel, stom
ach. No pneumoperitoneum.

 

IMPRESSION: Probable left lower lobe atelectasis versus pneumonia and associated effusion. Metastatic
 disease. Postprocedural changes. There may be an underlying ileus, correlate for enteritis, follow-u
p as indicated should bowel obstruction be suspected clinically.

## 2019-02-08 NOTE — P.PN
Subjective


Progress Note Date: 02/08/19


Principal diagnosis: 





Biliary Obstruction





Increased bili and renal fcn today and symptoms of nausea as well as emesis. 

Abdominal xray was completed due to no ostomy output. Probable ileus versus 

enteritis, ?pneumonia and atelectesis identified. 








Objective





- Vital Signs


Vital signs: 


 Vital Signs











Temp  98 F   02/08/19 12:28


 


Pulse  78   02/08/19 12:28


 


Resp  16   02/08/19 12:28


 


BP  125/61   02/08/19 12:28


 


Pulse Ox  94 L  02/08/19 12:28








 Intake & Output











 02/07/19 02/08/19 02/08/19





 18:59 06:59 18:59


 


Intake Total 600 2420 


 


Output Total 60 360 


 


Balance 540 2060 


 


Weight   78.9 kg


 


Intake:   


 


  Intake, IV Titration 600 1000 





  Amount   


 


    Sodium Chloride 0.9% 1, 600 900 





    000 ml @ 75 mls/hr IV .   





    [id number] [facility] Rx#:[id number]   


 


    cefTRIAXone 1,000 mg In  100 





    Sodium Chloride 0.9% 50   





    ml @ 100 mls/hr IVPB HS   





    [facility] Rx#:[id number]   


 


  Oral  1420 


 


Output:   


 


  Urine 60 360 


 


Other:   


 


  # Voids 1 1 














- Exam





- Constitutional


General appearance: Present: cooperative, no acute distress, thin





- EENT


EENT Comment(s): 





scleral icterus looks significantly less


Eyes: Present: EOMI


ENT: Present: hearing grossly normal





- Respiratory


Respiratory: bilateral: CTA, diminished





- Cardiovascular


Rhythm: regular


Heart sounds: normal: S1, S2





- Peripheral edema


  ** leg


Peripheral Edema: bilateral: None





- Gastrointestinal


General gastrointestinal: Present: distended, normal bowel sounds, soft





- Integumentary


Integumentary Comment(s): 





jaundice better, pt has some pink to his cheeks





- Neurologic


Neurologic: Present: CNII-XII intact





- Musculoskeletal


Musculoskeletal: Present: generalized weakness





- Psychiatric


Psychiatric Comment(s): 





Intermittent confusion


Psychiatric: Present: A&O x's 3, appropriate affect, intact judgment & insight





- Labs


CBC & Chem 7: 


 02/08/19 07:41





 02/08/19 07:41


Labs: 


 Abnormal Lab Results - Last 24 Hours (Table)











  02/08/19 02/08/19 Range/Units





  07:41 07:41 


 


RBC  3.27 L   (4.30-5.90)  m/uL


 


Hgb  10.1 L   (13.0-17.5)  gm/dL


 


Hct  33.8 L   (39.0-53.0)  %


 


MCV  103.4 H   (80.0-100.0)  fL


 


MCHC  29.9 L   (31.0-37.0)  g/dL


 


RDW  18.2 H   (11.5-15.5)  %


 


Carbon Dioxide   20 L  (22-30)  mmol/L


 


BUN   44 H  (9-20)  mg/dL


 


Creatinine   3.14 H  (0.66-1.25)  mg/dL


 


Calcium   6.8 L  (8.4-10.2)  mg/dL


 


Total Bilirubin   9.9 H  (0.2-1.3)  mg/dL


 


AST   127 H  (17-59)  U/L


 


Alkaline Phosphatase   334 H  ()  U/L


 


Albumin   2.6 L  (3.5-5.0)  g/dL














Assessment and Plan


Plan: 





(1) Congestive heart failure


Narrative/Plan: 


 - Cardiology management


Current Visit: Yes   Status: Acute   Priority: High   Code(s): I50.9 - HEART 

FAILURE, UNSPECIFIED   SNOMED Code(s): 74306097


   





(2) Elevated liver enzymes


Narrative/Plan: 


 - Improved post stent placement


Current Visit: Yes   Status: Acute   Priority: High   Code(s): R74.8 - ABNORMAL 

LEVELS OF OTHER SERUM ENZYMES   SNOMED Code(s): 499445076


   





(3) Liver failure


Narrative/Plan: 


 - Pt had biliary stents placed, continues to show improvement clinically 


Current Visit: Yes   Status: Acute   Priority: High   Code(s): K72.90 - HEPATIC 

FAILURE, UNSPECIFIED WITHOUT COMA   SNOMED Code(s): 34996644


   





(4) Generalized weakness


Narrative/Plan: 


 - Pt is planning on home care with PT/OT.  


Current Visit: Yes   Status: Acute   Priority: High   Code(s): R53.1 - WEAKNESS

   SNOMED Code(s): 45263322


   





(5) Prostate cancer


Narrative/Plan: 


 - Hold cabometyx.  F/U scheduled with terence Banks in chart. 


 - Plan of care will be determined then.  


 - Pt will cont lupron and RANK ligand inhibitor at discharge and in outpatient 

clinic 


Current Visit: Yes   Status: Acute   Priority: High   Code(s): C61 - MALIGNANT 

NEOPLASM OF PROSTATE   SNOMED Code(s): 112356072





Currently plan is to go home with home care as he was improving clinically 

after stent, although today he appears to have made a decline. Patient and 

family discussing options of hospice, which are appropriate if they choose. 

Will await their decision and move forward with homecare/palliative care versus 

hospice care. Discussed the option of palliative care as well.  


   





Physician Attest: I have completed the full history and physical of this 

patient and agree with above dictation by Cecilia Taylor NP dictated as a 

scribe.

## 2019-02-08 NOTE — P.PN
Subjective


Progress Note Date: 02/08/19


Principal diagnosis: 





Jaundice, elevated liver enzymes





Patient seen lying in bed with son and wife at his bedside.  Patient had some 

nausea and vomiting today.  No signs or symptoms of GI bleeding.  He remains 

jaundiced.





Objective





- Vital Signs


Vital signs: 


 Vital Signs











Temp  98 F   02/08/19 12:28


 


Pulse  78   02/08/19 12:28


 


Resp  16   02/08/19 12:28


 


BP  125/61   02/08/19 12:28


 


Pulse Ox  94 L  02/08/19 12:28








 Intake & Output











 02/07/19 02/08/19 02/08/19





 18:59 06:59 18:59


 


Intake Total 600 2420 


 


Output Total 60 360 


 


Balance 540 2060 


 


Weight   78.9 kg


 


Intake:   


 


  Intake, IV Titration 600 1000 





  Amount   


 


    Sodium Chloride 0.9% 1, 600 900 





    000 ml @ 75 mls/hr IV .   





    [id number] [facility] Rx#:[id number]   


 


    cefTRIAXone 1,000 mg In  100 





    Sodium Chloride 0.9% 50   





    ml @ 100 mls/hr IVPB HS   





    [facility] Rx#:[id number]   


 


  Oral  1420 


 


Output:   


 


  Urine 60 360 


 


Other:   


 


  # Voids 1 1 














- Exam





On physical examination, patient appears comfortable in no apparent distress. 


HEAD: Normocephalic, atraumatic. 


EYES: Scleral icterus noted. No conjunctival injection. 


MOUTH: No lesions, tongue midline. 


NECK: Trachea midline, no gross abnormalities. 


CHEST: Clear to auscultation with no wheezing or rhonchi appreciated. 


HEART: Regular rate and rhythm. 


ABDOMEN: Soft, colostomy noted with nonbloody output.  Urostomy with catheter. 

Bowel sounds are positive. No organomegaly.  No guarding or rigidity.


EXTREMITIES: No pedal edema. 


SKIN: No rashes, jaundice. 


NEUROLOGIC: Alert and oriented x3.  No focal deficits. 





- Labs


CBC & Chem 7: 


 02/08/19 07:41





 02/08/19 07:41


Labs: 


 Abnormal Lab Results - Last 24 Hours (Table)











  02/08/19 02/08/19 Range/Units





  07:41 07:41 


 


RBC  3.27 L   (4.30-5.90)  m/uL


 


Hgb  10.1 L   (13.0-17.5)  gm/dL


 


Hct  33.8 L   (39.0-53.0)  %


 


MCV  103.4 H   (80.0-100.0)  fL


 


MCHC  29.9 L   (31.0-37.0)  g/dL


 


RDW  18.2 H   (11.5-15.5)  %


 


Carbon Dioxide   20 L  (22-30)  mmol/L


 


BUN   44 H  (9-20)  mg/dL


 


Creatinine   3.14 H  (0.66-1.25)  mg/dL


 


Calcium   6.8 L  (8.4-10.2)  mg/dL


 


Total Bilirubin   9.9 H  (0.2-1.3)  mg/dL


 


AST   127 H  (17-59)  U/L


 


Alkaline Phosphatase   334 H  ()  U/L


 


Albumin   2.6 L  (3.5-5.0)  g/dL














Assessment and Plan


(1) Elevated liver enzymes


Narrative/Plan: 


Pleasant 78-year-old male with a known history of metastatic prostate cancer 

status post multiple surgeries, and radiation therapy and multiple lines of 

chemotherapy who presented with increasing weakness, fatigue and jaundice.  The 

patient was found to have an elevation in his liver enzymes with predominantly 

a cholestatic pattern and a total bilirubin of 11 on presentation.  Ultrasound 

of the abdomen did show multiple hepatic lesions without definite bile duct 

dilation.  It is unclear if the patient's elevation in bilirubin is secondary 

to diffuse intrahepatic metastases which would not be amenable to biliary 

stenting, or a large focal lesion causing bile duct obstruction which could be 

treated therapeutically with a stent.  MRCP imaging performed tonight did show 

liver metastases, indication of some biliary dilation, still unclear if 

elevation in bilirubin is secondary to intrahepatic disease or obstruction.  

Patient is status post ERCP with placement of a pigtailed plastic stent.  Liver 

enzymes have remained elevated after stent placement suggesting intrahepatic 

disease as the cause of the patient's elevation more so than obstruction.


Current Visit: Yes   Status: Acute   Priority: High   Code(s): R74.8 - ABNORMAL 

LEVELS OF OTHER SERUM ENZYMES   SNOMED Code(s): 993212698


   


Plan: 





Supportive care


Okay for diet


Continue to monitor liver enzymes


Further management by the oncology team, at this time discussion is for home 

with home care versus hospice


No further interventions by the gastroenterology service planned, if the patient

's clinical course dictates he should follow-up in 6-8 weeks to reevaluate for 

removal or replacement of stent


Thank you for allowing us to participate in the care of the patient, the 

gastroenterology service will stand by, please call us back with any questions 

or concerns

## 2019-02-08 NOTE — P.PN
Subjective


Progress Note Date: 02/08/19





This is a 78-year-old male, patient of Dr. Saavedra.  He has a medical 

history of prostate cancer that was diagnosed in 2009 status post prostatectomy 

with radiation treatments.  Patient was in remission until 2015 when he 

developed hematuria, at that time he was found to have a bladder mass and 

underwent resection with colostomy and urostomy.  He is receiving current 

therapy with Cabozantinib and sees oncology on regular basis.  Patient reports 

about a week ago he discontinued the Cabozantinib due to diarrhea, nausea, and 

vomiting.  He did call his oncologist office who advised him to go to the 

emergency room to be evaluated.  Patient's lab revealed hemoglobin 9.2, BUN 28, 

creatinine 1.91, total bilirubin 10.6, alk phos 334.  Ultrasound showed no 

gallstones, ascites, extensive heterogenicity in the liver, consistent with 

tumor, and mild gallbladder wall thickening.  Echocardiogram revealed atrial 

fibrillation, borderline left ventricular hypertrophy, ejection fraction 55-60%

, moderate aortic stenosis, mild aortic regurgitation, no evidence of pulmonary 

hypertension, large pleural effusion.  Chest x-ray showed atelectasis and 

effusions.  Cardiology consulted, as well as nephrology and oncology. 








2/3: Patient evaluated today, noted to be up in bedside chair.  He denies any 

nausea, vomiting, diarrhea, chest pain or dizziness.  MRCP has been ordered 

without contrast, will most likely be completed tomorrow.  Cardiology, 

nephrology and oncology consult appreciated.  He continues with IV fluids, 

losartan, amlodipine and Bactrim were discontinued due to acute kidney injury 

as per nephrology's recommendations.  Bactrim was changed to ceftriaxone.  

Conjugated bilirubin 4.8, unconjugated bilirubin 1.4, total bilirubin 10.6, AST 

114, ALT 47, alk phos 334.  Vital signs are stable, blood pressure 96/59, heart 

rate 78, he remains afebrile.





2/4: Patient states he feels a little bit better from yesterday.  He states he 

slept well.  He did receive morphine last night that helped.  He denies any 

shortness of breath while flat in bed.  He has had very little output from his 

colostomy but is eating very little.  Andrade catheter is a medium color orange.  

He is scheduled for MRCP today at 4 PM, ordered by GI to evaluate biliary tree.

  Initially plan was home with hospice but family states that they have seen 

Dr. Price and there may be plan for aggressive treatment.  Cardiology is 

following.  Patient is not candidate for anticoagulation with paroxysmal atrial 

fibrillation.  He is not currently on Lasix.  Nephrology continues to follow 

the patient.  Patient has been afebrile, blood pressure 110/63, heart rate 

running between 60 and 106, pulse ox 98% on room air.  White count is 3.4, 

hemoglobin 9.9, platelet count 167, , ALT 44, alkaline phosphatase 360.  

BUN 28 and creatinine 1.86.  Urine culture is positive for Enterobacter cloacae 

which is susceptible to ceftriaxone.  Patient will be transferred to the 

[facility] floor without telemetry.  Case management is following for discharge 

planning.





2/5: MRCP is limited by patient's body habitus.  Metastatic disease.  Bilateral 

pleural effusions.  Ascites.  There is some indication of biliary dilatation.  

Study is to be reviewed by  and radiology to determine plan.  If ERCP/

stent will not be of benefit, most likely patient will be placed under hospice 

and discharged home.  Renal function is worsening with BUN 33 and creatinine 

2.23.  Patient is followed by Dr. Rodas.  Patient states that he is not eating 

very much and is on a protein supplement.  He has been afebrile, blood pressure 

8458-139/65, pulse ox 92% on room air, heart rate running in the 60s to 80s.  

Hemoglobin is 9, platelet count 141.  Total bilirubin 10.2, , ALT 43, 

alkaline phosphatase 347.





2/6: Patient is scheduled for ERCP today.  He denies any abdominal pain.  He 

continues to have jaundice.  Patient remains afebrile, heart rate running in 

the 70s, pulse ox 93% on room air, blood pressure 85/55.  Discussed ostomy with 

the patient's wife and she is requesting Neosporin as this is what she normally 

gets around the area to prevent inflammation and irritation.  Recommend Flonase 

nasal spray be used at this area as it also contains some steroids to help with 

inflammation.  We will try this in the hospital.





2/7: Yesterday, patient underwent ERCP with cholangiogram, sphincterotomy, 

balloon sweep and stent placement with Dr. Fontanez.  Patient was cleared for 

full liquid diet last night and plan to monitor for pancreatitis.  Patient have 

the stent removed and 6-8 weeks and replace with metal stent.  Repeat lab work 

reveals white count 4.5, hemoglobin 9.2, platelet count 165.  BUN 39, 

creatinine 2.73, total bilirubin slightly improved at 9.4, , ALT 43, 

alkaline phosphatase 310.  Calcium replace by Dr. Rendon.  He has recommended 

IV fluids at 75 mL per hour. Encourage increased oral intake.  Patient denies 

any pain.  He is requesting that his IMV advance which will be changed to 

regular.  He is asking for cherry pie.  Family members present.  The bedside.  

All questions have been answered.  Plan to monitor liver test and once he is 

tolerating diet, discharged home.





2/8: Liver function tests and renal function are slightly increased from 

yesterday.  White count 7.3, hemoglobin 10.1, platelet count 185, CO2 20, BUN 

44 and creatinine 3.14.  Total bilirubin 9.9, , ALT 40, alkaline 

phosphatase 334.  Patient is complaining of nausea and vomiting and is status 

post Zofran and Reglan.  He denies any abdominal pain.  No abdominal 

tenderness.  Stoma appears to be normal but there is no output in colostomy 

bag.  He denies any shortness of breath.  Patient appears to be very tired 

today.  Discussed plan with patient's daughter and wife at the bedside.  

Hospice is recommended and they will plan to discuss this with the patient's 

son as well and make a determination.  At this point, patient is set up with 

home care.  Abdominal x-ray shows probable left lower lobe atelectasis versus 

pneumonia and associated effusion.  Metastatic disease.  Postprocedure changes.

  There may be underlying ileus, correlate for enteritis, follow-up is 

indicated should bowel obstruction be suspected.








Review Of Systems:


Constitutional: No fever, no chills, no night sweats.  No weight change.  

Reports weakness, reports fatigue reports lethargy.  


EENT: No headache.  No blurred vision or double vision, no loss of vision.  No 

loss of Hearing, no ringing in the ears, no dizziness.  No nasal drainage or 

congestion.  No epistaxis.  No sore throat.


Lungs: No shortness of breath, cough, no sputum production.  No wheezing.


Cardiovascular: No chest pain, no lower extremity edema.  No palpitations.  No 

paroxysmal nocturnal dyspnea.  No orthopnea.  No lightheadedness or dizziness.  

No syncopal episodes.


Abdominal: No abdominal pain.  reports nausea, reports vomiting.  No diarrhea.  

No constipation.  No bloody or tarry stools.  Denies loss of appetite.


Genitourinary: No dysuria, increased frequency, urgency.  No urinary retention.


Musculoskeletal: No myalgias.  No muscle weakness, no gait dysfunction, no 

frequent falls.  No back pain.  No neck pain.


Integumentary: No wounds, no lesions.  No rash or pruritus.  No unusual 

bruising.  No change in hair or nails.


Neurologic: No aphasia. No facial droop. No change in mentation. No head 

injury. No headache. No paralysis. No paresthesia.


Psychiatric: No depression.  No anxiety.  No mood swings.


Endocrine: No abnormal blood sugars.  No weight change.  No excessive sweating 

or thirst.  No cold intolerance. 








Objective





- Vital Signs


Vital signs: 


 Vital Signs











Temp  98.3 F   02/08/19 05:00


 


Pulse  88   02/08/19 08:00


 


Resp  18   02/08/19 08:00


 


BP  100/63   02/08/19 05:00


 


Pulse Ox  96   02/08/19 05:00








 Intake & Output











 02/07/19 02/08/19 02/08/19





 18:59 06:59 18:59


 


Intake Total 600 2420 


 


Output Total 60 360 


 


Balance 540 2060 


 


Intake:   


 


  Intake, IV Titration 600 1000 





  Amount   


 


    Sodium Chloride 0.9% 1, 600 900 





    000 ml @ 75 mls/hr IV .   





    [id number] [facility] Rx#:[id number]   


 


    cefTRIAXone 1,000 mg In  100 





    Sodium Chloride 0.9% 50   





    ml @ 100 mls/hr IVPB HS   





    [facility] Rx#:[id number]   


 


  Oral  1420 


 


Output:   


 


  Urine 60 360 


 


Other:   


 


  # Voids 1 1 














- Exam





General appearance: cooperative, no acute distress, patient resting in bed.  

Multiple family members are at the bedside.





- EENT


Eyes: EOMI, PERRLA, scleral icterus


ENT: hearing grossly normal, normal oropharynx





- Neck


Neck: no lymphadenopathy, normal ROM, no stridor, no thyromegaly


Thyroid: bilateral: normal size, negative: firm, nodule





- Respiratory


Respiratory: bilateral: CTA, negative: dullness, rales, rhonchi, wheezing





- Cardiovascular





 Bilateral lower extremity edema


Rhythm: irregularly irregular


Heart sounds: normal: S1, S2





- Gastrointestinal





Colostomy and urostomy.  No output in the colostomy.


General gastrointestinal: no distended, normal bowel sounds, soft, no tenderness





- Integumentary


Integumentary: jaundiced





- Neurologic


Neurologic: CNII-XII intact





- Musculoskeletal


Musculoskeletal: generalized weakness, strength equal bilaterally, no right 

sided weakness, no left sided weakness





- Psychiatric


Psychiatric: A&O x's 3





- Labs


CBC & Chem 7: 


 02/08/19 07:41





 02/08/19 07:41


Labs: 


 Abnormal Lab Results - Last 24 Hours (Table)











  02/08/19 02/08/19 Range/Units





  07:41 07:41 


 


RBC  3.27 L   (4.30-5.90)  m/uL


 


Hgb  10.1 L   (13.0-17.5)  gm/dL


 


Hct  33.8 L   (39.0-53.0)  %


 


MCV  103.4 H   (80.0-100.0)  fL


 


MCHC  29.9 L   (31.0-37.0)  g/dL


 


RDW  18.2 H   (11.5-15.5)  %


 


Carbon Dioxide   20 L  (22-30)  mmol/L


 


BUN   44 H  (9-20)  mg/dL


 


Creatinine   3.14 H  (0.66-1.25)  mg/dL


 


Calcium   6.8 L  (8.4-10.2)  mg/dL


 


Total Bilirubin   9.9 H  (0.2-1.3)  mg/dL


 


AST   127 H  (17-59)  U/L


 


Alkaline Phosphatase   334 H  ()  U/L


 


Albumin   2.6 L  (3.5-5.0)  g/dL














Assessment and Plan


Plan: 





1. 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 

failure: Ultrasound showed widespread liver metastasis, oncology on consult,  

MRCP as above.  Status post ERCP and stent.





2.  Acute on chronic diastolic heart failure: Cardiology on consult, 

echocardiogram resulted, 





3.  Acute kidney injury secondary and acute tubular necrosis to dehydration 

from poor oral intake. Bactrim, losartan and amlodipine held.  Continue IV 

hydration and increase oral intake.  Oral sodium bicarb Nephrology consult 

appreciated.





4.  Hypertension: Daily medications Norvasc 5 mg daily and Cozaar 150 mg held 

due to hypotension and acute kidney injury.





5.  Enterobacter urinary tract infection: Bactrim as outpatient discontinued, 

urine cultures are pending, started on ceftriaxone.





6.  Paroxysmal atrial fibrillation: Currently rate controlled, not a candidate 

for anticoagulation





7.  Hypotension.  Blood pressure medications on hold





6.  GI, DVT prophylaxis: Continue Protonix, SEDs 





7.  Hypocalcemia, status post replacement





8.  Nausea and vomiting possibly related to underlying ileus.





Discharge plan: Home with home care or hospice care in the next 24 hours.  

Discussed option of hospice and possibly could make patient inpatient hospice 

for symptom control.  Await family to discuss and make decision.





The above impression and plan of care have been discussed and directed by 

signing physician. Diane Barbour nurse practitioner acting as scribe for signing 

physician.

## 2019-02-09 VITALS
DIASTOLIC BLOOD PRESSURE: 59 MMHG | SYSTOLIC BLOOD PRESSURE: 94 MMHG | RESPIRATION RATE: 17 BRPM | TEMPERATURE: 98.2 F | HEART RATE: 94 BPM

## 2019-02-09 LAB
ALBUMIN SERPL-MCNC: 2.3 G/DL (ref 3.5–5)
ALP SERPL-CCNC: 324 U/L (ref 38–126)
ALT SERPL-CCNC: 41 U/L (ref 21–72)
ANION GAP SERPL CALC-SCNC: 14 MMOL/L
AST SERPL-CCNC: 127 U/L (ref 17–59)
BUN SERPL-SCNC: 48 MG/DL (ref 9–20)
CALCIUM SPEC-MCNC: 6.5 MG/DL (ref 8.4–10.2)
CHLORIDE SERPL-SCNC: 105 MMOL/L (ref 98–107)
CO2 SERPL-SCNC: 19 MMOL/L (ref 22–30)
ERYTHROCYTE [DISTWIDTH] IN BLOOD BY AUTOMATED COUNT: 3.04 M/UL (ref 4.3–5.9)
ERYTHROCYTE [DISTWIDTH] IN BLOOD: 18.6 % (ref 11.5–15.5)
GLUCOSE SERPL-MCNC: 69 MG/DL (ref 74–99)
HCT VFR BLD AUTO: 31.5 % (ref 39–53)
HGB BLD-MCNC: 9.9 GM/DL (ref 13–17.5)
MCH RBC QN AUTO: 32.5 PG (ref 25–35)
MCHC RBC AUTO-ENTMCNC: 31.4 G/DL (ref 31–37)
MCV RBC AUTO: 103.6 FL (ref 80–100)
PLATELET # BLD AUTO: 184 K/UL (ref 150–450)
POTASSIUM SERPL-SCNC: 4.7 MMOL/L (ref 3.5–5.1)
PROT SERPL-MCNC: 5.9 G/DL (ref 6.3–8.2)
SODIUM SERPL-SCNC: 138 MMOL/L (ref 137–145)
WBC # BLD AUTO: 8.5 K/UL (ref 3.8–10.6)

## 2019-02-09 RX ADMIN — FLUTICASONE PROPIONATE SCH: 50 SPRAY, METERED NASAL at 08:24

## 2019-02-09 RX ADMIN — PANTOPRAZOLE SODIUM SCH MG: 40 TABLET, DELAYED RELEASE ORAL at 08:23

## 2019-02-09 RX ADMIN — Medication SCH: at 12:20

## 2019-02-09 RX ADMIN — Medication SCH MG: at 08:23

## 2019-02-09 RX ADMIN — CEFAZOLIN SCH: 330 INJECTION, POWDER, FOR SOLUTION INTRAMUSCULAR; INTRAVENOUS at 11:08

## 2019-02-09 RX ADMIN — CEFAZOLIN SCH: 330 INJECTION, POWDER, FOR SOLUTION INTRAMUSCULAR; INTRAVENOUS at 17:08

## 2019-02-09 RX ADMIN — MORPHINE SULFATE PRN MG: 4 INJECTION, SOLUTION INTRAMUSCULAR; INTRAVENOUS at 08:24

## 2019-02-09 NOTE — P.PN
Subjective





Patient is seen in follow-up for acute kidney injury.  Renal function is worse 

today with creatinine up to 3.53.  Patient had a biliary stent placed February 6.  He has a urostomy in place.  Oral intake remains poor.  Denies chest pain 

or shortness of breath.





Vital signs are stable.


General: The patient appeared well nourished and normally developed.  Appears 

jaundiced.


HEENT: Head exam is unremarkable. Neck is without jugular venous distension.


LUNGS: Lungs are clear to auscultation and percussion. Breath sounds decreased.


HEART: Rate and Rhythm are regular. First and second heart sounds normal. No 

murmurs, rubs or gallops. 


ABDOMEN: Abdominal exam reveals normal bowel sounds. Non-tender and non-

distended. 


EXTREMITITES: No clubbing, cyanosis, or edema.





Objective





- Vital Signs


Vital signs: 


 Vital Signs











Temp  98.2 F   02/09/19 05:00


 


Pulse  94   02/09/19 05:00


 


Resp  17   02/09/19 05:00


 


BP  94/59   02/09/19 05:00


 


Pulse Ox  95   02/09/19 05:00








 Intake & Output











 02/08/19 02/09/19 02/09/19





 18:59 06:59 18:59


 


Intake Total 600  


 


Output Total 300 400 


 


Balance 300 -400 


 


Weight 78.9 kg  


 


Intake:   


 


  Intake, IV Titration 600  





  Amount   


 


    Sodium Chloride 0.9% 1, 600  





    000 ml @ 75 mls/hr IV .   





    [id number] [facility] Rx#:[id number]   


 


Output:   


 


  Urine 300 400 


 


Other:   


 


  # Voids 1 2 














- Labs


CBC & Chem 7: 


 02/09/19 06:29





 02/09/19 06:29


Labs: 


 Abnormal Lab Results - Last 24 Hours (Table)











  02/09/19 02/09/19 Range/Units





  06:29 06:29 


 


RBC  3.04 L   (4.30-5.90)  m/uL


 


Hgb  9.9 L   (13.0-17.5)  gm/dL


 


Hct  31.5 L   (39.0-53.0)  %


 


MCV  103.6 H   (80.0-100.0)  fL


 


RDW  18.6 H   (11.5-15.5)  %


 


Carbon Dioxide   19 L  (22-30)  mmol/L


 


BUN   48 H  (9-20)  mg/dL


 


Creatinine   3.53 H  (0.66-1.25)  mg/dL


 


Glucose   69 L  (74-99)  mg/dL


 


Calcium   6.5 L  (8.4-10.2)  mg/dL


 


Total Bilirubin   9.2 H  (0.2-1.3)  mg/dL


 


AST   127 H  (17-59)  U/L


 


Alkaline Phosphatase   324 H  ()  U/L


 


Total Protein   5.9 L  (6.3-8.2)  g/dL


 


Albumin   2.3 L  (3.5-5.0)  g/dL














Assessment and Plan


Plan: 





Assessment:


1.  Acute kidney injury secondary to ATN.  Concern for pigmented nephropathy 

from elevated bilirubin.  Renal function continues to worsen with creatinine at 

3.53 today.


2.  History of prostate cancer with bladder mass status post cystectomy, ileal 

loop urostomy and colostomy placement.  Oncology following.


3.  Obstructive jaundice secondary to metastatic disease status post biliary 

stent placement on February 6.  No significant improvement.  Likely 

intrahepatic.


4.  UTI with urine culture positive for Enterobacter.  Maintained on 

antibiotics.


5.  Hypocalcemia secondary to acute kidney injury.  Corrected calcium for 

albumin is near 7.7.


6.  History of hypertension.  Blood pressure low this morning.


7.  Metabolic acidosis secondary to acute kidney injury maintained on oral 

sodium bicarbonate.





Plan:


Maintain normal saline at 75 mL an hour.


Overall prognosis poor.


Patient is not a candidate for renal replacement therapy.

## 2019-02-09 NOTE — P.PN
Subjective


Progress Note Date: 02/09/19





The patient appears to have gotten weaker.  He was confused at the time of my 

evaluation, but had received morphine shortly before.  He has been generally 

more uncomfortable and complaining of generalized pain.  Oral intake is very 

poor.  No obvious bleeding, fevers or chills.





Objective





- Vital Signs


Vital signs: 


 Vital Signs











Temp  98.2 F   02/09/19 05:00


 


Pulse  94   02/09/19 05:00


 


Resp  17   02/09/19 05:00


 


BP  94/59   02/09/19 05:00


 


Pulse Ox  95   02/09/19 05:00








 Intake & Output











 02/08/19 02/09/19 02/09/19





 18:59 06:59 18:59


 


Intake Total 600  


 


Output Total 300 400 


 


Balance 300 -400 


 


Weight 78.9 kg  


 


Intake:   


 


  Intake, IV Titration 600  





  Amount   


 


    Sodium Chloride 0.9% 1, 600  





    000 ml @ 75 mls/hr IV .   





    [id number] [facility] Rx#:[id number]   


 


Output:   


 


  Urine 300 400 


 


Other:   


 


  # Voids 1 2 














- Constitutional


General appearance: Present: mild distress





- EENT


Eyes: Present: EOMI, scleral icterus


ENT: Present: hearing grossly normal





- Respiratory


Respiratory: bilateral: diminished





- Cardiovascular


Rhythm: irregularly irregular


Heart sounds: normal: S1, S2





- Gastrointestinal


Gastrointestinal Comment(s): 





Urostomy and colostomy in lower quadrants


General gastrointestinal: Present: distended, soft





- Integumentary


Integumentary: Present: normal





- Neurologic


Neurologic: Present: CNII-XII intact





- Musculoskeletal


Musculoskeletal: Present: generalized weakness, strength equal bilaterally





- Psychiatric


Psychiatric Comment(s): 





Lethargic, confused





- Labs


CBC & Chem 7: 


 02/09/19 06:29





 02/09/19 06:29


Labs: 


 Abnormal Lab Results - Last 24 Hours (Table)











  02/09/19 02/09/19 Range/Units





  06:29 06:29 


 


RBC  3.04 L   (4.30-5.90)  m/uL


 


Hgb  9.9 L   (13.0-17.5)  gm/dL


 


Hct  31.5 L   (39.0-53.0)  %


 


MCV  103.6 H   (80.0-100.0)  fL


 


RDW  18.6 H   (11.5-15.5)  %


 


Carbon Dioxide   19 L  (22-30)  mmol/L


 


BUN   48 H  (9-20)  mg/dL


 


Creatinine   3.53 H  (0.66-1.25)  mg/dL


 


Glucose   69 L  (74-99)  mg/dL


 


Calcium   6.5 L  (8.4-10.2)  mg/dL


 


Total Bilirubin   9.2 H  (0.2-1.3)  mg/dL


 


AST   127 H  (17-59)  U/L


 


Alkaline Phosphatase   324 H  ()  U/L


 


Total Protein   5.9 L  (6.3-8.2)  g/dL


 


Albumin   2.3 L  (3.5-5.0)  g/dL














Assessment and Plan


(1) Liver failure


Narrative/Plan: 


The patient's bilirubin and alkaline phosphatase have not shown major 

improvement after stent placement.  Thus it appears that there may have been an 

obstructive component that was fairly minor, with majority of the abdomen 

apparently being due to hepatocellular disease.  Review of GI note indicates 

that they concur with that impression.


   The patient appears to be declining clinically.


Current Visit: Yes   Status: Acute   Priority: High   Code(s): K72.90 - HEPATIC 

FAILURE, UNSPECIFIED WITHOUT COMA   SNOMED Code(s): 71252155


   





(2) Congestive heart failure


Current Visit: Yes   Status: Acute   Priority: High   Code(s): I50.9 - HEART 

FAILURE, UNSPECIFIED   SNOMED Code(s): 37241085


   





(3) Prostate cancer


Narrative/Plan: 


As noted above, persistence of high liver enzymes after placement of stent, 

indicates that the patient likely has progression of hepatocellular disease.  

It has been discussed previously, in detail with him and his family, that 

systemic treatment options are very limited at this time.  The plan therefore 

was for him to follow-up with Dr. Monson as an outpatient, to discuss any 

appropriate options.  However, given minor improvement in his liver enzymes, 

and significant clinical decline, consideration of comfort care at this time 

appears to be most appropriate.  Case was discussed with Dr. Monson, who was 

also in agreement.


Current Visit: Yes   Status: Acute   Priority: High   Code(s): C61 - MALIGNANT 

NEOPLASM OF PROSTATE   SNOMED Code(s): 850683685


   





(4) Acute kidney injury


Narrative/Plan: 


Kidney function continues to worsen, despite supportive care.  This is likely 

due to third spacing from his liver compromise, which is persistent.  The 

worsening of creatinine despite the current management, is also indicative of 

poor prognosis


Current Visit: Yes   Status: Acute   Code(s): N17.9 - ACUTE KIDNEY FAILURE, 

UNSPECIFIED   SNOMED Code(s): 75784953

## 2019-02-10 NOTE — P.DS
Providers


Date of admission: 


19 16:20





Expected date of discharge: 19


Attending physician: 


Chandra Martinez





Consults: 





 





19 16:20


Consult Physician Routine 


   Consulting Provider: Alfredo Jeronimo


   Consult Reason/Comments: Evaluate for heart failure


   Do you want consulting provider notified?: Yes


Consult Physician Routine 


   Consulting Provider: Ellen Rodas


   Consult Reason/Comments: Renal insufficiency


   Do you want consulting provider notified?: Yes


Consult Physician Stat 


   Consulting Provider: Raymond Price


   Consult Reason/Comments: Oncological care


   Do you want consulting provider notified?: Already Contacted











Primary care physician: 


Elie[facility] [place] Course: 





This is a 78-year-old male, patient of Dr. Saavedra.  He has a medical 

history of prostate cancer that was diagnosed in  status post prostatectomy 

with radiation treatments.  Patient was in remission until  when he 

developed hematuria, at that time he was found to have a bladder mass and 

underwent resection with colostomy and urostomy.  He is receiving current 

therapy with Cabozantinib and sees oncology on regular basis.  Patient reports 

about a week ago he discontinued the Cabozantinib due to diarrhea, nausea, and 

vomiting.  He did call his oncologist office who advised him to go to the 

emergency room to be evaluated.  Patient's lab revealed hemoglobin 9.2, BUN 28, 

creatinine 1.91, total bilirubin 10.6, alk phos 334.  Ultrasound showed no 

gallstones, ascites, extensive heterogenicity in the liver, consistent with 

tumor, and mild gallbladder wall thickening.  Echocardiogram revealed atrial 

fibrillation, borderline left ventricular hypertrophy, ejection fraction 55-60%

, moderate aortic stenosis, mild aortic regurgitation, no evidence of pulmonary 

hypertension, large pleural effusion.  Chest x-ray showed atelectasis and 

effusions.  Cardiology consulted, as well as nephrology and oncology. 








2/3: Patient evaluated today, noted to be up in bedside chair.  He denies any 

nausea, vomiting, diarrhea, chest pain or dizziness.  MRCP has been ordered 

without contrast, will most likely be completed tomorrow.  Cardiology, 

nephrology and oncology consult appreciated.  He continues with IV fluids, 

losartan, amlodipine and Bactrim were discontinued due to acute kidney injury 

as per nephrology's recommendations.  Bactrim was changed to ceftriaxone.  

Conjugated bilirubin 4.8, unconjugated bilirubin 1.4, total bilirubin 10.6, AST 

114, ALT 47, alk phos 334.  Vital signs are stable, blood pressure 96/59, heart 

rate 78, he remains afebrile.





: Patient states he feels a little bit better from yesterday.  He states he 

slept well.  He did receive morphine last night that helped.  He denies any 

shortness of breath while flat in bed.  He has had very little output from his 

colostomy but is eating very little.  Andrade catheter is a medium color orange.  

He is scheduled for MRCP today at 4 PM, ordered by GI to evaluate biliary tree.

  Initially plan was home with hospice but family states that they have seen 

Dr. Price and there may be plan for aggressive treatment.  Cardiology is 

following.  Patient is not candidate for anticoagulation with paroxysmal atrial 

fibrillation.  He is not currently on Lasix.  Nephrology continues to follow 

the patient.  Patient has been afebrile, blood pressure 110/63, heart rate 

running between 60 and 106, pulse ox 98% on room air.  White count is 3.4, 

hemoglobin 9.9, platelet count 167, , ALT 44, alkaline phosphatase 360.  

BUN 28 and creatinine 1.86.  Urine culture is positive for Enterobacter cloacae 

which is susceptible to ceftriaxone.  Patient will be transferred to the 

[facility] floor without telemetry.  Case management is following for discharge 

planning.





: MRCP is limited by patient's body habitus.  Metastatic disease.  Bilateral 

pleural effusions.  Ascites.  There is some indication of biliary dilatation.  

Study is to be reviewed by  and radiology to determine plan.  If ERCP/

stent will not be of benefit, most likely patient will be placed under hospice 

and discharged home.  Renal function is worsening with BUN 33 and creatinine 

2.23.  Patient is followed by Dr. Rodas.  Patient states that he is not eating 

very much and is on a protein supplement.  He has been afebrile, blood pressure 

8458-139/65, pulse ox 92% on room air, heart rate running in the 60s to 80s.  

Hemoglobin is 9, platelet count 141.  Total bilirubin 10.2, , ALT 43, 

alkaline phosphatase 347.





2: Patient is scheduled for ERCP today.  He denies any abdominal pain.  He 

continues to have jaundice.  Patient remains afebrile, heart rate running in 

the 70s, pulse ox 93% on room air, blood pressure 85/55.  Discussed ostomy with 

the patient's wife and she is requesting Neosporin as this is what she normally 

gets around the area to prevent inflammation and irritation.  Recommend Flonase 

nasal spray be used at this area as it also contains some steroids to help with 

inflammation.  We will try this in the hospital.





: Yesterday, patient underwent ERCP with cholangiogram, sphincterotomy, 

balloon sweep and stent placement with Dr. Fontanez.  Patient was cleared for 

full liquid diet last night and plan to monitor for pancreatitis.  Patient have 

the stent removed and 6-8 weeks and replace with metal stent.  Repeat lab work 

reveals white count 4.5, hemoglobin 9.2, platelet count 165.  BUN 39, 

creatinine 2.73, total bilirubin slightly improved at 9.4, , ALT 43, 

alkaline phosphatase 310.  Calcium replace by Dr. Rendon.  He has recommended 

IV fluids at 75 mL per hour. Encourage increased oral intake.  Patient denies 

any pain.  He is requesting that his IMV advance which will be changed to 

regular.  He is asking for cherry pie.  Family members present.  The bedside.  

All questions have been answered.  Plan to monitor liver test and once he is 

tolerating diet, discharged home.





: Liver function tests and renal function are slightly increased from 

yesterday.  White count 7.3, hemoglobin 10.1, platelet count 185, CO2 20, BUN 

44 and creatinine 3.14.  Total bilirubin 9.9, , ALT 40, alkaline 

phosphatase 334.  Patient is complaining of nausea and vomiting and is status 

post Zofran and Reglan.  He denies any abdominal pain.  No abdominal 

tenderness.  Stoma appears to be normal but there is no output in colostomy 

bag.  He denies any shortness of breath.  Patient appears to be very tired 

today.  Discussed plan with patient's daughter and wife at the bedside.  

Hospice is recommended and they will plan to discuss this with the patient's 

son as well and make a determination.  At this point, patient is set up with 

home care.  Abdominal x-ray shows probable left lower lobe atelectasis versus 

pneumonia and associated effusion.  Metastatic disease.  Postprocedure changes.

  There may be underlying ileus, correlate for enteritis, follow-up is 

indicated should bowel obstruction be suspected.





:Repeat lab work reveals Hgb 9.9, BUN 48, Creatinine 3.53, total bilirubin 

9.2, , ALT 41, alkaline phosphatase 324, ammonia level XVI.  Patient is 

confused today and quite groggy.  Discussed discharge planning with the patient'

s son and he is agreeable to take the patient home with hospice care.  Patient 

is expected to have less than 2 weeks.  [facility] is in place.  

Patient will be discharged home today in stable condition but death is expected 

in less than 2 weeks.





Patient  prior to being able to get home.  Please see nursing 

documentation for details.


Preliminary cause of death: Prostate cancer with metastasis disease with liver 

metastasis and hepatic failure








Discharge diagnoses:


1. 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 

failure


2.  Acute on chronic diastolic heart failure


3.  Acute kidney injury secondary and acute tubular necrosis to dehydration 

from poor oral intake.


4.  Hypertension


5.  Enterobacter urinary tract infection as outpatient


6.  Paroxysmal atrial fibrillation


7.  Hypotension. 


8.  Hypocalcemia, status post replacement


9.  Nausea and vomiting possibly related to underlying ileus.





Discharge plan: Home with [facility].





The above impression and plan of care have been discussed and directed by 

signing physician. Diane Barbour nurse practitioner acting as scribe for signing 

physician.








Patient Condition at Discharge: Undetermined





Plan - Discharge Summary


Discharge Rx Participant: No


New Discharge Prescriptions: 


New


   Atropine Ophth Soln 1% 5Ml [Isopto Atropine 1% 5Ml] 2 drops PO Q4HR PRN #1 

bottle


     PRN Reason: Secretions


   LORazepam ORAL CONC [Ativan Intensol] 2 mg PO Q4HR PRN #30 ml


     PRN Reason: Anxiety


   MORPHINE ORAL SOL CONC 20mg/mL [Roxanol Oral Soln Conc 20MG/ML] 5 mg PO Q4H 

PRN #30 ml


     PRN Reason: Pain





Discontinued


   amLODIPine BES/OLMESARTAN MED [Fabricio 5-40 mg Tablet] 1 tab PO DAILY


   Sulfamethox-Tmp 800-160Mg [Bactrim -160 mg] 1 tab PO Q12HR


   Cholecalciferol [Vitamin D3] 1,000 unit PO DAILY


   Pantoprazole Sodium [Protonix] 40 mg PO DAILY


Discharge Medication List





Atropine Ophth Soln 1% 5Ml [Isopto Atropine 1% 5Ml] 2 drops PO Q4HR PRN #1 

bottle 19 [Rx]


LORazepam ORAL CONC [Ativan Intensol] 2 mg PO Q4HR PRN #30 ml 19 [Rx]


MORPHINE ORAL SOL CONC 20mg/mL [Roxanol Oral Soln Conc 20MG/ML] 5 mg PO Q4H PRN 

#30 ml 19 [Rx]








Follow up Appointment(s)/Referral(s): 


Eliezer Rick DO [Primary Care Provider] - As Needed


[facility] Visiting Nurse, [NON-STAFF] - 1 Week


Patient Instructions/Handouts:  Lorazepam (By mouth), Atropine (Into the eye), 

Morphine, Slow Release (By mouth), Heart Failure (DC), Acute Liver Failure (DC)

, Prostate Cancer (DC), Weakness (DC)


Discharge Disposition: 





- Preliminary Cause of Death


Preliminary Cause of Death: Prostate cancer with metastasis disease with liver 

metastasis and hepatic f

## 2022-07-03 NOTE — P.PN
Subjective


Progress Note Date: 02/06/19





This is a 78-year-old male, patient of Dr. Saavedra.  He has a medical 

history of prostate cancer that was diagnosed in 2009 status post prostatectomy 

with radiation treatments.  Patient was in remission until 2015 when he 

developed hematuria, at that time he was found to have a bladder mass and 

underwent resection with colostomy and urostomy.  He is receiving current 

therapy with Cabozantinib and sees oncology on regular basis.  Patient reports 

about a week ago he discontinued the Cabozantinib due to diarrhea, nausea, and 

vomiting.  He did call his oncologist office who advised him to go to the 

emergency room to be evaluated.  Patient's lab revealed hemoglobin 9.2, BUN 28, 

creatinine 1.91, total bilirubin 10.6, alk phos 334.  Ultrasound showed no 

gallstones, ascites, extensive heterogenicity in the liver, consistent with 

tumor, and mild gallbladder wall thickening.  Echocardiogram revealed atrial 

fibrillation, borderline left ventricular hypertrophy, ejection fraction 55-60%

, moderate aortic stenosis, mild aortic regurgitation, no evidence of pulmonary 

hypertension, large pleural effusion.  Chest x-ray showed atelectasis and 

effusions.  Cardiology consulted, as well as nephrology and oncology. 








2/3: Patient evaluated today, noted to be up in bedside chair.  He denies any 

nausea, vomiting, diarrhea, chest pain or dizziness.  MRCP has been ordered 

without contrast, will most likely be completed tomorrow.  Cardiology, 

nephrology and oncology consult appreciated.  He continues with IV fluids, 

losartan, amlodipine and Bactrim were discontinued due to acute kidney injury 

as per nephrology's recommendations.  Bactrim was changed to ceftriaxone.  

Conjugated bilirubin 4.8, unconjugated bilirubin 1.4, total bilirubin 10.6, AST 

114, ALT 47, alk phos 334.  Vital signs are stable, blood pressure 96/59, heart 

rate 78, he remains afebrile.





2/4: Patient states he feels a little bit better from yesterday.  He states he 

slept well.  He did receive morphine last night that helped.  He denies any 

shortness of breath while flat in bed.  He has had very little output from his 

colostomy but is eating very little.  Andrade catheter is a medium color orange.  

He is scheduled for MRCP today at 4 PM, ordered by GI to evaluate biliary tree.

  Initially plan was home with hospice but family states that they have seen 

Dr. Price and there may be plan for aggressive treatment.  Cardiology is 

following.  Patient is not candidate for anticoagulation with paroxysmal atrial 

fibrillation.  He is not currently on Lasix.  Nephrology continues to follow 

the patient.  Patient has been afebrile, blood pressure 110/63, heart rate 

running between 60 and 106, pulse ox 98% on room air.  White count is 3.4, 

hemoglobin 9.9, platelet count 167, , ALT 44, alkaline phosphatase 360.  

BUN 28 and creatinine 1.86.  Urine culture is positive for Enterobacter cloacae 

which is susceptible to ceftriaxone.  Patient will be transferred to the 

[facility] floor without telemetry.  Case management is following for discharge 

planning.





2/5: MRCP is limited by patient's body habitus.  Metastatic disease.  Bilateral 

pleural effusions.  Ascites.  There is some indication of biliary dilatation.  

Study is to be reviewed by  and radiology to determine plan.  If ERCP/

stent will not be of benefit, most likely patient will be placed under hospice 

and discharged home.  Renal function is worsening with BUN 33 and creatinine 

2.23.  Patient is followed by Dr. Rodas.  Patient states that he is not eating 

very much and is on a protein supplement.  He has been afebrile, blood pressure 

8458-139/65, pulse ox 92% on room air, heart rate running in the 60s to 80s.  

Hemoglobin is 9, platelet count 141.  Total bilirubin 10.2, , ALT 43, 

alkaline phosphatase 347.





2/6: Patient is scheduled for ERCP today.  He denies any abdominal pain.  He 

continues to have jaundice.  Patient remains afebrile, heart rate running in 

the 70s, pulse ox 93% on room air, blood pressure 85/55.  Discussed ostomy with 

the patient's wife and she is requesting Neosporin as this is what she normally 

gets around the area to prevent inflammation and irritation.  Recommend Flonase 

nasal spray be used at this area as it also contains some steroids to help with 

inflammation.  We will try this in the hospital.








Review Of Systems:


Constitutional: No fever, no chills, no night sweats.  No weight change.  

Reports weakness, reports fatigue reports lethargy.  


EENT: No headache.  No blurred vision or double vision, no loss of vision.  No 

loss of Hearing, no ringing in the ears, no dizziness.  No nasal drainage or 

congestion.  No epistaxis.  No sore throat.


Lungs: No shortness of breath, cough, no sputum production.  No wheezing.


Cardiovascular: No chest pain, no lower extremity edema.  No palpitations.  No 

paroxysmal nocturnal dyspnea.  No orthopnea.  No lightheadedness or dizziness.  

No syncopal episodes.


Abdominal: No abdominal pain.  No nausea, vomiting.  No diarrhea.  No 

constipation.  No bloody or tarry stools reports loss of appetite.


Genitourinary: No dysuria, increased frequency, urgency.  No urinary retention.


Musculoskeletal: No myalgias.  No muscle weakness, no gait dysfunction, no 

frequent falls.  No back pain.  No neck pain.


Integumentary: No wounds, no lesions.  No rash or pruritus.  No unusual 

bruising.  No change in hair or nails.


Neurologic: No aphasia. No facial droop. No change in mentation. No head 

injury. No headache. No paralysis. No paresthesia.


Psychiatric: No depression.  No anxiety.  No mood swings.


Endocrine: No abnormal blood sugars.  No weight change.  No excessive sweating 

or thirst.  No cold intolerance. 








Objective





- Vital Signs


Vital signs: 


 Vital Signs











Temp  98.4 F   02/06/19 06:45


 


Pulse  114 H  02/06/19 06:45


 


Resp  20   02/06/19 06:45


 


BP  85/55   02/06/19 06:45


 


Pulse Ox  93 L  02/06/19 06:45








 Intake & Output











 02/05/19 02/06/19 02/06/19





 18:59 06:59 18:59


 


Intake Total 350 40 


 


Output Total  300 


 


Balance 350 -260 


 


Weight 78.9 kg  


 


Intake:   


 


  Intake, IV Titration 350  





  Amount   


 


    Dextrose 5% in Water 1, 350  





    000 ml @ 50 mls/hr IV .   





    Q23H TRISH with Sodium   





    Bicarb (1 Meq/ml) 150 ml   





    Rx#:[id number]   


 


  Oral 0 40 


 


Output:   


 


  Urine  300 


 


Other:   


 


  # Voids 0  


 


  # Bowel Movements 0  














- Exam





General appearance: cooperative, no acute distress, patient resting in bed.  

Son at the bedside.





- EENT


Eyes: EOMI, PERRLA, scleral icterus


ENT: hearing grossly normal, normal oropharynx





- Neck


Neck: no lymphadenopathy, normal ROM, no stridor, no thyromegaly


Thyroid: bilateral: normal size, negative: firm, nodule





- Respiratory


Respiratory: bilateral: CTA, negative: dullness, rales, rhonchi, wheezing





- Cardiovascular





 Bilateral lower extremity edema


Rhythm: irregularly irregular


Heart sounds: normal: S1, S2





- Gastrointestinal





Colostomy and urostomy


General gastrointestinal: no distended, normal bowel sounds, soft, no tenderness





- Integumentary


Integumentary: jaundiced





- Neurologic


Neurologic: CNII-XII intact





- Musculoskeletal


Musculoskeletal: generalized weakness, strength equal bilaterally, no right 

sided weakness, no left sided weakness





- Psychiatric


Psychiatric: A&O x's 3





- Labs


CBC & Chem 7: 


 02/05/19 06:00





 02/05/19 06:00





Assessment and Plan


Plan: 





1. 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 

failure: Ultrasound showed widespread liver metastasis, oncology on consult,  

MRCP as above.  GI to perform ERCP today.





2.  Acute on chronic diastolic heart failure: Cardiology on consult, 

echocardiogram resulted, 





3.  Acute kidney injury secondary to dehydration from poor oral intake. 

nephrology on consult, Bactrim, losartan and amlodipine held.  Patient was 

placed on a bicarb drip.





4.  Hypertension: Daily medications Norvasc 5 mg daily and Cozaar 150 mg held 

due to hypotension and acute kidney injury.





5.  Enterobacter urinary tract infection: Bactrim as outpatient discontinued, 

urine cultures are pending, started on ceftriaxone.





6.  Paroxysmal atrial fibrillation: Currently rate controlled, not a candidate 

for anticoagulation





7.  Hypotension.  Blood pressure medications on hold





6.  GI, DVT prophylaxis: Continue Protonix, SEDs 





Discharge plan: Home with home care or hospice in the next 24 hours.


The above impression and plan of care have been discussed and directed by 

signing physician. Diane Barbour nurse practitioner acting as scribe for signing 

physician. [Time Spent: ___ minutes] : I have spent [unfilled] minutes of time on the encounter.